# Patient Record
Sex: FEMALE | Race: WHITE
[De-identification: names, ages, dates, MRNs, and addresses within clinical notes are randomized per-mention and may not be internally consistent; named-entity substitution may affect disease eponyms.]

---

## 2017-05-01 NOTE — DIAGNOSTIC IMAGING REPORT
PROCEDURE: CT left lower extremity without contrast.



TECHNIQUE: Multiple contiguous axial images were obtained through

the left lower extremity without the use of intravenous contrast.

Sagittal and coronal reformations were then performed.



INDICATION:  Left ankle swelling and bruising.



COMPARISON: None available.



FINDINGS:

There is no acute or healing fracture about the ankle. Normal

variant os trigonum is present in the posterior aspect of the

talus. There is no edema-like attenuation around the os trigonum

to suggest impingement in this region. No osteochondral lesion of

the talar dome.



The peroneal tendons are normal in position. There is suggestion

of a small amount of fluid in the common peroneal sheath at the

level of the lateral malleolus. The peroneus brevis is intact

upon the base of the fifth metatarsal. The peroneus longus is

normal where visualized. Distal Achilles tendon is normal by CT.

The posterior tibialis, flexor hallucis longus and flexor

digitorum longus tendons also appear normal by CT. Anterior

extensor tendons are also unremarkable.



No ankle joint effusion. The posterior subtalar joint space is

well-maintained. No tarsal coalition. Sclerotic focus in the

anterior and inferior aspect of the calcaneus is compatible with

benign bone island.



IMPRESSION:

1. No acute or healing fracture.

2. Suggestion of tenosynovitis of the common peroneal tendon

sheath at the level of the lateral malleolus. By CT, the peroneal

tendons are otherwise normal in appearance. If patient's symptoms

persist and there are not contraindications, MRI of the ankle can

better evaluate the soft tissues.



Dictated by: 



  Dictated on workstation # [id number]

## 2017-06-02 NOTE — ED CHEST PAIN
General


Chief Complaint:  Chest Pain


Stated Complaint:  FATIGUE/CHEST PAIN


Source:  patient, RN/MD, RN notes reviewed


Exam Limitations:  no limitations





History of Present Illness


Time seen by provider:  21:10


Initial Comments


Patient presents c/ c/o intermittent chest pain, sweating, nausea, and profound 

fatigue this evening PTA.  Apparently has been having spells like this of late 

and is being worked up by her PCP, but has never had an episode as bad as 

earlier this evening.  Spoke c/ Dr. Salguero this PM, and she encouraged her to 

come in and be seen.  Patient states there have some recent concerns regarding 

possible fibromyalgia, or a tick related illness.


Timing/Duration:  getting worse, intermittent, other (current episode this PM 

PTA)


Severity/Quality:  severe (fatigue)


Location:  central


Radiation:  no radiation


Activities at Onset:  activity (walking into house from car)


Prior CP/Workup:  no prior cardiac workup


Modifying Factors:  worse with movement


ASA po PTA:  No


NTG SL PTA:  No


Associated Symptoms:  diaphoresis, fatigue, nausea/vomiting (s/ vomiting)





Allergies and Home Medications


Allergies


Coded Allergies:  


     NKANo Known Allergies (Unverified  Allergy, Mild, 2/14/10)





Home Medications


Cholecalciferol 5,000 Unit Tablet, 5,000 UNIT PO DAILY, (Reported)


Duloxetine HCl 60 Mg Capsule., #30 (Reported)


Estradiol 1 Mg Tablet, #30 (Reported)


Hyoscyamine Sulfate 0.125 Mg Tab.subl, 1-2 TAB SL Q4H, #15


   Prescribed by: NICOLETTE BLISS on 11/30/16 2222


Levothyroxine Sodium 50 Mcg Tablet, 50 MCG PO DAILY, (Reported)


Losartan/Hydrochlorothiazide 1 Each Tablet, 1 EACH PO DAILY, (Reported)


Mirabegron 50 Mg Tab.er.24h, #30 (Reported)


Montelukast Sodium 10 Mg Tablet, #30 (Reported)


Ondansetron 4 Mg Tab.rapdis, 4 MG PO Q4H, #10


   Prescribed by: NICOLETTE BLISS on 11/30/16 2222


Pantoprazole Sodium 40 Mg Tablet.dr, 40 MG PO DAILY, #30 Ref 6


   Prescribed by: CARLOS STEEN on 9/23/15 0759


Saxagliptin Hcl/Metformin Hcl 1 Each Tbmp.24hr, 1 EACH PO DAILY, (Reported)


Sucralfate 1 Gm/10 Ml Oral.susp, 1 GM PO QID, #400


   Prescribed by: NICOLETTE BLISS on 11/30/16 2222


Vitamin B Complex 1 Tab Tablet, 1 TAB PO DAILY, (Reported)





Review of Systems


Constitutional:  see HPI, diaphoresis, malaise


Cardiovascular:  See HPI, Chest Pain


Gastrointestinal:  See HPI, Nausea


Endocrine:  See HPI, Excessive Sweating





All Other Systems Reviewed


Negative Unless Noted:  Yes





Past Medical-Social-Family Hx


Patient Social History


Recent Foreign Travel:  No


Contact w/Someone Who Travel:  No


Recent Hopitalizations:  No





Seasonal Allergies


Seasonal Allergies:  No





Surgeries


HX Surgeries:  Yes


Surgeries:  Bladder Surgery, Hysterectomy, Oophorectomy, Orthopedic, 

Tonsillectomy





Respiratory


Hx Respiratory Disorders:  No





Cardiovascular


Hx Cardiac Disorders:  Yes


Cardiac Disorders:  Hypertension





Neurological


Hx Neurological Disorders:  No





Reproductive System


Hx Reproductive Disorders:  No


Female Reproductive Disorders:  Endometriosis, Polycystic Ovarian Dis


GYN History:  Hysterectomy





Genitourinary


Hx Genitourinary Disorders:  Yes (has bladder stimulator)


Genitourinary Disorders:  Bladder Infection, Neurogenic Bladder





Gastrointestinal


Hx Gastrointestinal Disorders:  Yes


Gastrointestinal Disorders:  Diverticulosis, Ulcer





Musculoskeletal


Hx Musculoskeletal Disorders:  Yes (BACK SX FOR HERNIATED DISC)


Musculoskeletal Disorders:  Chronic Back Pain





Endocrine


Hx Endocrine Disorders:  Yes (OBESITY)


Endocrine Disorders:  Hypothyroidsim





HEENT


HX ENT Disorders:  Yes (SURGERY FOR DETATCHED RETINA)





Cancer


Hx Cancer:  No





Psychosocial


Hx Psychiatric Problems:  No





Integumentary


HX Skin/Integumentary Disorder:  No





Blood Transfusions


Hx Blood Disorders:  No





Family Medical History


Family Medial History:  


Not obtainable due to adoption





Physical Exam


Vital Signs





Vital Sign - Last 12Hours








 6/2/17





 21:08


 


Temp 98.1


 


Pulse 110


 


Resp 18


 


B/P (MAP) 134/101


 


Pulse Ox 96


 


O2 Delivery Room Air


 


O2 Flow Rate 2.0





Capillary Refill :


General Appearance:  No Apparent Distress, WD/WN, Obese


HEENT:  Normal ENT Inspection


Neck:  Normal Inspection


Respiratory:  No Respiratory Distress


Cardiovascular:  Regular Rate, Rhythm, Tachycardia


Gastrointestinal:  Non Tender, Other (obese)


Rectal:  Deferred


Neurologic/Psychiatric:  Alert, Oriented x3, No Motor/Sensory Deficits, Normal 

Mood/Affect


Skin:  Warm/Dry





Focused Exam


Lactic Acid Level





Laboratory Tests








Test


  6/2/17


21:30 6/2/17


23:49


 


Lactic Acid Level


  2.43 MMOL/L


(0.50-2.00)  *H 1.03 MMOL/L


(0.50-2.00)











Progress/Results/Core Measures


Results/Orders


Lab Results





Laboratory Tests








Test


  6/2/17


21:25 6/2/17


21:30 6/2/17


21:49 6/2/17


23:49 Range/Units


 


 


Urine Color YELLOW      


 


Urine Clarity CLEAR      


 


Urine pH 6     5-9  


 


Urine Specific Gravity 1.010 L    1.016-1.022  


 


Urine Protein NEGATIVE     NEGATIVE  


 


Urine Glucose (UA) NEGATIVE     NEGATIVE  


 


Urine Ketones NEGATIVE     NEGATIVE  


 


Urine Nitrite NEGATIVE     NEGATIVE  


 


Urine Bilirubin 2+ H    NEGATIVE  


 


Urine Urobilinogen 4 H    NORMAL  MG/DL


 


Urine Leukocyte Esterase 1+ H    NEGATIVE  


 


Urine RBC (Auto) NEGATIVE     NEGATIVE  


 


Urine RBC NONE      /HPF


 


Urine WBC 2-5      /HPF


 


Urine Squamous Epithelial


Cells 2-5 


  


  


  


   /HPF


 


 


Urine Crystals NONE      /LPF


 


Urine Bacteria NONE      /HPF


 


Urine Casts NONE      /LPF


 


Urine Mucus NEGATIVE      /LPF


 


Urine Culture Indicated NO      


 


White Blood Count


  


  8.6 


  


  


  4.3-11.0


10^3/uL


 


Red Blood Count


  


  4.44 


  


  


  4.35-5.85


10^6/uL


 


Hemoglobin  13.0    11.5-16.0  G/DL


 


Hematocrit  39    35-52  %


 


Mean Corpuscular Volume  89    80-99  FL


 


Mean Corpuscular Hemoglobin  29    25-34  PG


 


Mean Corpuscular Hemoglobin


Concent 


  33 


  


  


  32-36  G/DL


 


 


Red Cell Distribution Width  14.9 H   10.0-14.5  %


 


Platelet Count


  


  276 


  


  


  130-400


10^3/uL


 


Mean Platelet Volume  10.3    7.4-10.4  FL


 


Neutrophils (%) (Auto)  60    42-75  %


 


Lymphocytes (%) (Auto)  26    12-44  %


 


Monocytes (%) (Auto)  10    0-12  %


 


Eosinophils (%) (Auto)  3    0-10  %


 


Basophils (%) (Auto)  0    0-10  %


 


Neutrophils # (Auto)  5.2    1.8-7.8  X 10^3


 


Lymphocytes # (Auto)  2.3    1.0-4.0  X 10^3


 


Monocytes # (Auto)  0.9    0.0-1.0  X 10^3


 


Eosinophils # (Auto)


  


  0.2 


  


  


  0.0-0.3


10^3/uL


 


Basophils # (Auto)


  


  0.0 


  


  


  0.0-0.1


10^3/uL


 


Erythrocyte Sedimentation Rate  30 H   0-20  MM/HR


 


Sodium Level  140    135-145  MMOL/L


 


Potassium Level  3.5 L   3.6-5.0  MMOL/L


 


Chloride Level  104      MMOL/L


 


Carbon Dioxide Level  20 L   21-32  MMOL/L


 


Anion Gap  16 H   5-14  MMOL/L


 


Blood Urea Nitrogen  11    7-18  MG/DL


 


Creatinine


  


  1.49 H


  


  


  0.60-1.30


MG/DL


 


Estimat Glomerular Filtration


Rate 


  38 


  


  


   


 


 


BUN/Creatinine Ratio  7     


 


Glucose Level  142 H     MG/DL


 


Lactic Acid Level


  


  2.43 *H


  


  1.03 


  0.50-2.00


MMOL/L


 


Calcium Level  9.6    8.5-10.1  MG/DL


 


Magnesium Level  2.2    1.8-2.4  MG/DL


 


Total Bilirubin  4.3 H   0.1-1.0  MG/DL


 


Aspartate Amino Transf


(AST/SGOT) 


  197 H


  


  


  5-34  U/L


 


 


Alanine Aminotransferase


(ALT/SGPT) 


  316 H


  


  


  0-55  U/L


 


 


Alkaline Phosphatase  166 H     U/L


 


Troponin I  < 0.30    <0.30  NG/ML


 


C-Reactive Protein High


Sensitivity 


  2.06 H


  


  


  0.00-0.50


MG/DL


 


B-Type Natriuretic Peptide  16.2    <100.0  PG/ML


 


Total Protein  7.5    6.4-8.2  G/DL


 


Albumin  4.1    3.2-4.5  G/DL


 


Lipase  10    8-78  U/L


 


Thyroid Stimulating Hormone


(TSH) 


  0.57 


  


  


  0.35-4.94


UIU/ML








My Orders





Orders - JOCELINE PAGE DO


Saline Lock/Iv-Start (6/2/17 21:15)


Ekg Tracing (6/2/17 21:15)


BNP (6/2/17 21:15)


Cbc With Automated Diff (6/2/17 21:15)


Comprehensive Metabolic Panel (6/2/17 21:15)


Hs C Reactive Protein (6/2/17 21:15)


Lactic Acid Analyzer (6/2/17 21:15)


Lipase (6/2/17 21:15)


Magnesium (6/2/17 21:15)


Thyroid Stimulating Hormone (6/2/17 21:15)


Tick Panel With Lyme Eia (6/2/17 21:15)


Troponin I (6/2/17 21:15)


Ua Culture If Indicated (6/2/17 21:15)


Blood Culture (6/2/17 21:15)


Erythrocyte Sedimentation Rate (6/2/17 21:15)


Saline Lock/Iv-Start (6/2/17 22:03)


Ns Iv 1000 Ml (Sodium Chloride 0.9%) (6/2/17 22:03)


Ct Chest/Abdomen/Pelvis Wo (6/2/17 22:29)





Medications Given in ED





Current Medications








 Medications  Dose


 Ordered  Sig/Juan


 Route  Start Time


 Stop Time Status Last Admin


Dose Admin


 


 Sodium Chloride  1,000 ml @ 


 0 mls/hr  Q0M ONCE


 IV  6/2/17 22:03


 6/2/17 22:04 DC 6/2/17 22:10


0 MLS/HR








Vital Signs/I&O





Vital Sign - Last 12Hours








 6/2/17 6/2/17 6/2/17





 21:08 21:08 22:00


 


Temp 98.1  98.1


 


Pulse 110  98


 


Resp 18  18


 


B/P (MAP) 134/101  135/99


 


Pulse Ox 96  88


 


O2 Delivery Room Air Nasal Cannula 


 


O2 Flow Rate  2.0 2.0














Intake and Output 


 


 6/3/17





 00:00


 


Intake Total 1000 ml


 


Balance 1000 ml











Diagnostic Imaging





   Diagonstic Imaging:  CT


   Plain Films/CT/US/NM/MRI:  abdomen, pelvis (nothing acute x/ hepatomegaly)





Departure


Communication


Time/Spoke to Admitting Phy:  23:40





Impression


Impression:  


 Primary Impression:  


 Fatigue


 Additional Impressions:  


 Elevated LFT's of UDE


 Non-cardiac chest pain


 Elevated lactic acid level


Disposition:  09 ADMITTED AS INPATIENT


Condition:  Stable


Decision to Admit Reason:  Admit from ER (General)


Decision to Admit/Date:  Jun 2, 2017


Time/Decision to Admit Time:  23:40





Departure-Patient Inst.


Referrals:  


ECHO DUARTE DO (PCP/Family)


Primary Care Physician











JOCELINE PAGE DO Jun 2, 2017 21:04

## 2017-06-03 NOTE — DIAGNOSTIC IMAGING REPORT
PROCEDURE: US Gallbladder.



TECHNIQUE: Multiple real-time grayscale images were obtained over

the right upper quadrant in various projections.



INDICATION:  Right upper quadrant pain. 



COMPARISON: CT chest, abdomen and pelvis without contrast

6/2/2017.



FINDINGS: Examination limited by technical limitations related to

tissue depth. Hyperechogenicity of the liver consistent with

diffuse fatty infiltration. No focal mass or fluid collection in

the liver identified. No cholelithiasis. Gallbladder wall

thickness measures approximately 2 mm. No pericholecystic fluid

collection. The common bile duct and pancreas are obscured. Right

kidney is partially visualized and measures approximately 11.5

cm. No evidence of hydronephrosis. Negative sonographic Cleaning

sign. No free fluid identified in the visualized abdomen.



IMPRESSION: 

1. Limited examination.

2. Hepatic steatosis.

3. The gallbladder is unremarkable. The common bile duct is

obscured.



Dictated by: 



  Dictated on workstation # [id number]

## 2017-06-03 NOTE — DIAGNOSTIC IMAGING REPORT
PROCEDURE: CT chest, abdomen, and pelvis without contrast.



TECHNIQUE: Multiple contiguous axial images were obtained through

the chest, abdomen, and pelvis without the use of intravenous

contrast.



INDICATION:  Chest pain, fatigue, and weakness.



COMPARISON: Comparison is made with the prior study from November 30, 2016.



FINDINGS: The lungs demonstrate no evidence of a focal

consolidation. There is some minor dependent atelectasis at the

lung bases. There is no effusion or pneumothorax. There is no

evidence of mediastinal adenopathy. The thoracic aorta is normal

in caliber and heart size appears normal. There is no pericardial

effusion.



Hepatomegaly is present with associated steatosis. There is some

focal fatty sparing adjacent to the gallbladder. There is no

radiodense gallstone or evidence of biliary dilatation. The

spleen is unremarkable. There are some small splenules. Pancreas

demonstrates some mild atrophy but no focal abnormality. There is

stable right adrenal nodule. The kidneys appear nonobstructed

without evidence of urolithiasis.



There is extensive diverticulosis present with some haziness

within the fat about the sigmoid colon which may reflect a mild

diverticulitis or colitis. There is no free air, free fluid, or

abscess. The appendix is normal.



Patient is status post hysterectomy. Urinary bladder is

unremarkable. No pathologically enlarged abdominal or pelvic

lymph nodes are demonstrated. The aorta is normal in caliber.

There are degenerative features within the lumbar spine, but

there is no acute or suspicious osseous abnormality demonstrated.

A sacral InterStim device is noted. There is a small

fat-containing umbilical hernia.



IMPRESSION:

1. No acute process is evident within the chest.

2. Extensive colonic diverticulosis with mild haziness around the

sigmoid colon suggesting a mild diverticulitis or colitis. There

is no free air, free fluid, abscess, or obstruction.

3. Hepatic steatosis and hepatomegaly.

4. Status post previous hysterectomy.

5. Stable right adrenal nodule.



Dictated by: 



  Dictated on workstation # [id number]

## 2017-06-03 NOTE — HISTORY & PHYSICIAL
History of Present Illness


History of Present Illness


Reason for visit/HPI


PT IS A 46 Y/O FEMALE WHO IS A CLINIC PATIENT OF DR. BASILIO FOR WHOM I AM ON 

CALL TODAY.  SHE PRESENTED TO THE EMERGENCY DEPARTMENT WITH ABDOMINAL DISCOMFORT

/EPIGASTRIC DISCOMFORT, AND SEVERE WEAKNESS AND FATIGUE.  


SHE REPORTS THAT SHE HAS BEEN FEELING UNWELL FOR ABOUT 3-4 WEEKS.  SHE STATES 

THAT SHE HAS BEEN SEEN AT URGENT CARE LAST WEEK AND DIAGNOSED WITH A "POSSIBLE" 

URINARY TRACT INFECTION, STARTED ON BACTRIM DS AND PYRIDIUM, STARTED TO HAVE 

ITCHING, AND CALLED DR. BASILIO WHO TOLD THE PATIENT TO STOP THE ANTIBIOTIC DUE 

TO HER SYMPTOMS OF ITCHING.  SHE STATES THAT SHE NEVER REALLY HAD URINARY TRACT 

SYMPTOMS.


SHE REPORTS THAT SHE HAD LABS DONE A WEEK OR SO AGO WHICH SHOWED AN ELEVATED 

WHITE COUNT.  SHE DOES NOT RECALL A REPORT OF ELEVATED LIVER ENZYMES.


SHE REPORTS THAT HER MEDICATION WAS RECENTLY CHANGED - WITH STOPPING OF HER 

METFORMIN AND STARTING OF JANUMET INSTEAD.  SHE REPORTS THAT SHE IS SUPPOSED TO 

BE ON CYMBALTA, BUT HAS NOT BEEN ABLE TO AFFORD THE MEDICATION AND HAS NOT BEEN 

TAKING IT FOR ABOUT 2 MONTHS.





SHE COMPLAINS OF SOME ITCHING OF HER SKIN.


SHE DENIES ANY ACUTE RASHES.


Date of Admission


Jun 2, 2017 at 23:50


I consulted on this patient on


6/3/17


 08:53


Attending Physician


Jocelyn Basilio DO


Admitting Physician


 Jerson Sandy MD


Consult








Allergies and Home Medications


Allergies


Coded Allergies:  


     NKANo Known Allergies (Unverified  Allergy, Mild, 2/14/10)





Home Medications


Cholecalciferol 5,000 Unit Tablet, 5,000 UNIT PO DAILY, (Reported)


Diclofenac Sod 100 Mg Tab, 100 MG PO BID, (Reported)


Duloxetine HCl 60 Mg Capsule., #30 (Reported)


Estradiol 1 Mg Tablet, #30 (Reported)


Fesoterodine Fumarate 4 Mg Tab.sr.24h, 4 MG PO DAILY, (Reported)


Hyoscyamine Sulfate 0.125 Mg Tab.subl, 1-2 TAB SL Q4H, #15


   Prescribed by: NICOLETTE BLISS on 11/30/16 2222


Levothyroxine Sodium 50 Mcg Tablet, 50 MCG PO DAILY, (Reported)


Losartan/Hydrochlorothiazide 1 Each Tablet, 1 EACH PO DAILY, (Reported)


Mirabegron 50 Mg Tab.er.24h, #30 (Reported)


Montelukast Sodium 10 Mg Tablet, #30 (Reported)


Ondansetron 4 Mg Tab.rapdis, 4 MG PO Q4H, #10


   Prescribed by: NICOLETTE BLISS on 11/30/16 2222


Pantoprazole Sodium 40 Mg Tablet.dr, 40 MG PO DAILY, #30 Ref 6


   Prescribed by: CARLOS STEEN on 9/23/15 0759


Saxagliptin Hcl/Metformin Hcl 1 Each Tbmp.24hr, 1 EACH PO DAILY, (Reported)


Sucralfate 1 Gm/10 Ml Oral.susp, 1 GM PO QID, #400


   Prescribed by: NICOLETTE BLISS on 11/30/16 2222


Tramadol HCl 50 Mg Tablet, 50 MG PO q4-6 PRN for PAIN-MILD TO MODERATE, (

Reported)


Vitamin B Complex 1 Tab Tablet, 1 TAB PO DAILY, (Reported)





Past Medical-Social-Family Hx


Patient Social History


Marrital Status:  


Living Status:  LIVES AT HOME WITH HER SPOUSE


Employed/Student:  employed (WORKS AT [employer])


Alcohol Use:  Denies Use


Recreational Drug Use:  No


Smoking Status:  Never a Smoker


2nd Hand Smoke Exposure:  Yes (AT WORK)


Physical Abuse Screen:  No


Sexual Abuse:  No


Recent Foreign Travel:  No


Contact w/other who traveled:  No


Recent Hopitalizations:  No


Recent Infectious Disease Expo:  No





Immunizations Up To Date


Tetanus Booster (TDap):  Less than 5yrs





Seasonal Allergies


Seasonal Allergies:  No





Surgeries


HX Surgeries:  Yes


Surgeries:  Bladder Surgery, Hysterectomy, Oophorectomy, Orthopedic, 

Tonsillectomy





Respiratory


Hx Respiratory Disorders:  No





Cardiovascular


Hx Cardiovascular Disorders:  Yes


Cardiac Disorders:  Hypertension





Neurological


Hx Neurological Disorders:  No





Reproductive System


Pregnant:  No


Hx Reproductive Disorders:  No


Female Reproductive Disorders:  Endometriosis, Polycystic Ovarian Dis


GYN Hx:  Hysterectomy





Genitourinary


Hx Genitourinary Disorders:  Yes (has bladder stimulator)


Genitourinary Disorders:  Bladder Infection, Neurogenic Bladder





Gastrointestinal


Hx Gastrointestinal Disorders:  Yes


Gastrointestinal Disorders:  Diverticulosis, Ulcer





Musculoskeletal


Hx Musculoskeletal Disorders:  Yes (BACK SX FOR HERNIATED DISC)


Musculoskeletal Disorders:  Chronic Back Pain





Endocrine


Hx Endocrine Disorders:  Yes (OBESITY)


Endocrine Disorders:  Hypothyroidsim, Diabetes, Non-Insulin dep





HEENT


HX ENT Disorders:  Yes (SURGERY FOR DETATCHED RETINA)


Loss of Vision:  Denies


Hearing Impairment:  Denies





Cancer


Hx Cancer:  No





Psychosocial


Hx Psychiatric Problems:  Yes


Behavioral Health Disorders:  Depression





Integumentary


HX Skin/Integumentary Disorder:  No





Blood Transfusions


Hx Blood Disorders:  No


Adverse Reaction to a Blood Tr:  No





Reviewed Nursing Assessment


Reviewed/Agree w Nursing PMH:  Yes





Family Medical History


Significant Family History:  Other Conditions/Hx (PT ADOPTED, NO FAMILY HISTORY 

AVAILABLE)


Family Hx:  


Not obtainable due to adoption





Constitutional:  No chills, malaise, weakness


EENTM:  No blurred vision, No double vision, No hoarseness, No mouth pain, No 

throat pain


Respiratory:  No cough, No dyspnea on exertion, No short of breath, No wheezing


Cardiovascular:  No chest pain, No palpitations


Gastrointestinal:  abdominal pain (RUQ), No constipation, No diarrhea, No loss 

of appetite, No melena, No nausea, No vomiting


Genitourinary:  no symptoms reported, No dysuria, No frequency


Pregnant:  No


Musculoskeletal:  No back pain, No muscle stiffness, muscle weakness


Skin:  change in color (SLIGHTLY YELLOW), No dryness, No lesions, pruritus, No 

rash


Psychiatric/Neurological:  Denies Anxiety, Denies Headache, Denies Numbness, 

Weakness


All Other Systems Reviewed


Negative Unless Noted:  Yes





Physical Exam


Vital Signs





Vital Sign - Last 12Hours








 6/2/17





 21:08


 


Temp 98.1


 


Pulse 110


 


Resp 18


 


B/P (MAP) 134/101


 


Pulse Ox 96


 


O2 Delivery Room Air


 


O2 Flow Rate 2.0





Capillary Refill : Less Than 3 Seconds


General Appearance:  No Apparent Distress, WD/WN, Obese


Eyes:  Bilateral Eye EOMI, Bilateral Eye Normal Inspection, Bilateral Eye PERRL


HEENT:  PERRL/EOMI, Pharynx Normal


Neck:  Full Range of Motion, Supple


Respiratory:  Chest Non Tender, Lungs Clear, Normal Breath Sounds, No Accessory 

Muscle Use, No Respiratory Distress


Cardiovascular:  Regular Rate, Rhythm, No Edema, No Murmur


Gastrointestinal:  Normal Bowel Sounds, Hepatomegaly, Tenderness (IN RIGHT 

UPPER QUADRANT, EPIGASTRIUM)


Rectal:  Deferred


Back:  Normal Inspection, No CVA Tenderness, No Vertebral Tenderness


Extremity:  Normal Capillary Refill, Normal Range of Motion, Non Tender, No 

Calf Tenderness, No Pedal Edema


Neurologic/Psychiatric:  Alert, Oriented x3, No Motor/Sensory Deficits, Normal 

Mood/Affect, CNs II-XII Norm as Tested


Skin:  Warm/Dry, Jaundice (VERY MILD JAUNDICE)


Lymphatic:  No Adenopathy





Assessment/Plan


Assessment and Plan


RIGHT UPPER QUADRANT PAIN


ELEVATED LIVER ENZYMES


HYPERBILIRUBINEMIA


HEPATOMEGALY


FATIGUE


MUSCLE WEAKNESS


ELEVATED LACTIC ACID


HYPERTENSION


DEPRESSION


URINARY URGE INCONTINENCE


DIABETES MELLITUS








RIGHT UPPER QUADRANT PAIN, ELEVATED LIVER ENZYMES, HYPERBILIRUBINEMIA, 

HEPATOMEGALY, ELEVATED LACTIC ACID - PT ADMITTED TO THE HOSPITAL WITH ELEVATED 

LIVER ENZYMES, HEPATOMEGALY AND HYPERBILIRUBINEMIA - WITH HER RIGHT UPPER 

QUADRANT PAIN, I HAVE ORDERED A GALLBLADDER ULTRASOUND.  HER CT SCAN FINDINGS 

HAVE NOT YET BEEN FINALIZED ON THE COMPUTER SYSTEM.





FATIGUE AND MUSCLE WEAKNESS - SHOULD IMPROVE WITH PATIENT'S HYDRATION, AND 

IMPROVEMENT OF LIVER ENZYMES.





HYPERTENSION  - HOLD CURRENT MEDICATION, WILL START ON METFORMIN IF 

ANTIHYPERTENSIVE MEDICATION IS NEEDED AS HER CURRENT MEDICATION HAS HEPATIC 

METABOLISM.





DEPRESSION - PT HAS NOT BEEN TAKING HER CYMBALTA, THE METABOLISM IS THROUGH 

LIVER ENZYME PATHWAYS, THEREFORE, WOULD NOT RESTART AT THIS TIME.





URINARY URGE INCONTINENCE  - HOLD MYRBETRIQ AS IT MAY BE HAVING SOME IMPACT ON 

HEPATIC FAILURE - MONITOR SYMPTOMS, MAY CONSIDER OTHER MEDICATIONS DEPENDING ON 

METABOLISM AND HER LIVER SYMPTOMS.





DIABETES MELLITUS - HOLD DIABETIC MEDICATIONS AT THIS TIME, WITH PT ON LIQUID 

DIET, MONITOR FSBS, HGBA1C PENDING, IF NEEDED WILL CONSIDER SLIDING SCALE 

INSULIN.


Problems:  





Admission Diagnosis


RIGHT UPPER QUADRANT PAIN


ELEVATED LIVER ENZYMES


HYPERBILIRUBINEMIA


HEPATOMEGALY


FATIGUE


MUSCLE WEAKNESS


ELEVATED LACTIC ACID


HYPERTENSION


DEPRESSION


URINARY URGE INCONTINENCE


DIABETES MELLITUS





Clinical Quality Measures


AMI/AHF:


ASA po Prior to arrival:  No





DVT/VTE Risk/Contraindication:


Risk Factor Score Per Nursing:  3


RFS Level Per Nursing on Admit:  3=High











JERSON SANDY MD Jair 3, 2017 08:53

## 2017-06-04 NOTE — PROGRESS NOTE (SOAP)
Subjective


Date Seen by Provider:  Jun 4, 2017


Time Seen by Provider:  08:29


Subjective/Events-last exam


PT REPORTS THAT SHE IS FEELING BETTER.  SHE STATES THAT SHE STILL HAS SOME 

ABDOMINAL PAIN - PAIN IN EPIGASTRIUM.


Review of Systems


General:  Fatigue


Pulmonary:  No Dyspnea, No Cough


Cardiovascular:  No: Chest Pain


Gastrointestinal:  Abdominal Pain (EPIGASTRIUM), No: Nausea


Neurological:  Weakness, No: Confusion





Objective


Exam





Vital Signs








  Date Time  Temp Pulse Resp B/P (MAP) Pulse Ox O2 Delivery O2 Flow Rate FiO2


 


6/4/17 07:00  81      


 


6/4/17 04:00 97.3 70 18 119/57 95   


 


6/4/17 01:00  95      


 


6/4/17 00:00 98.2 92 18 122/70 94   


 


6/3/17 20:00 97.3 81 18 135/86 97   


 


6/3/17 19:00  102      


 


6/3/17 16:00 98.0 55 18 135/83 95   


 


6/3/17 13:00  83      


 


6/3/17 12:00 96.6 71 20 124/59 94   














I & O 


 


 6/4/17





 07:00


 


Intake Total 1860 ml


 


Output Total 2325 ml


 


Balance -465 ml





Capillary Refill : Less Than 3 Seconds


General Appearance:  No Apparent Distress, WD/WN


HEENT:  PERRL/EOMI, Pharynx Normal


Neck:  Full Range of Motion, Supple


Respiratory:  Chest Non Tender, Lungs Clear, Normal Breath Sounds, No Accessory 

Muscle Use


Cardiovascular:  Regular Rate, Rhythm, No Edema


Gastrointestinal:  normal bowel sounds, soft, tenderness (EPIGASTRIUM)


Neurologic/Psychiatric:  Alert, Oriented x3, No Motor/Sensory Deficits, Normal 

Mood/Affect


Skin:  Warm/Dry


Lymphatic:  No Adenopathy





Results


Lab


Laboratory Tests


6/3/17 10:37: Glucometer 169H


6/3/17 15:27: Glucometer 115H


6/3/17 21:06: Glucometer 159H


6/4/17 04:05: 


White Blood Count 6.3, Red Blood Count 4.06L, Hemoglobin 11.8, Hematocrit 37, 

Mean Corpuscular Volume 90, Mean Corpuscular Hemoglobin 29, Mean Corpuscular 

Hemoglobin Concent 32, Red Cell Distribution Width 15.2H, Platelet Count 213, 

Mean Platelet Volume 10.8H, Sodium Level 140, Potassium Level 4.2, Chloride 

Level 108H, Carbon Dioxide Level 24, Anion Gap 8, Blood Urea Nitrogen 4L, 

Creatinine 0.67, Estimat Glomerular Filtration Rate > 60, BUN/Creatinine Ratio 6

, Glucose Level 113H, Calcium Level 8.5, Total Bilirubin 2.9H, Aspartate Amino 

Transf (AST/SGOT) 105H, Alanine Aminotransferase (ALT/SGPT) 222H, Alkaline 

Phosphatase 141H, Total Protein 6.0L, Albumin 3.1L


6/4/17 05:08: Glucometer 120H





Microbiology


6/2/17 Blood Culture - Preliminary, Resulted


         No growth





Assessment/Plan


Assessment/Plan


Assess & Plan/Chief Complaint


RIGHT UPPER QUADRANT PAIN


ELEVATED LIVER ENZYMES


HYPERBILIRUBINEMIA


HEPATOMEGALY


FATIGUE


MUSCLE WEAKNESS


ELEVATED LACTIC ACID


HYPERTENSION


DEPRESSION


URINARY URGE INCONTINENCE


DIABETES MELLITUS








RIGHT UPPER QUADRANT PAIN, ELEVATED LIVER ENZYMES, HYPERBILIRUBINEMIA, 

HEPATOMEGALY, ELEVATED LACTIC ACID - PT ADMITTED TO THE HOSPITAL WITH ELEVATED 

LIVER ENZYMES, HEPATOMEGALY AND HYPERBILIRUBINEMIA - WITH HER RIGHT UPPER 

QUADRANT PAIN, I HAVE ORDERED A GALLBLADDER ULTRASOUND - WHICH WAS NEGATIVE.  

HER CT SCAN FINDINGS SHOW A POSSIBLE DIVERTICULITIS - STARTED ON FLAGYL.





FATIGUE AND MUSCLE WEAKNESS - SHOULD CONTINUE TO IMPROVE WITH PATIENT'S 

HYDRATION, AND IMPROVEMENT OF LIVER ENZYMES.





HYPERTENSION  - HOLD CURRENT MEDICATION, WILL START ON METOPROLOL





DEPRESSION - PT HAS NOT BEEN TAKING HER CYMBALTA, THE METABOLISM IS THROUGH 

LIVER ENZYME PATHWAYS, THEREFORE, WOULD NOT RESTART AT THIS TIME.





URINARY URGE INCONTINENCE  - HOLD MYRBETRIQ AS IT MAY BE HAVING SOME IMPACT ON 

HEPATIC FAILURE - MONITOR SYMPTOMS, MAY CONSIDER OTHER MEDICATIONS DEPENDING ON 

METABOLISM AND HER LIVER SYMPTOMS.





DIABETES MELLITUS - HOLD DIABETIC MEDICATIONS AT THIS TIME, WITH PT ON LIQUID 

DIET, MONITOR FSBS, HGBA1C 5.8





Clinical Quality Measures


AMI/AHF:


ASA po Prior to arrival:  No





DVT/VTE Risk/Contraindication:


Risk Factor Score Per Nursing:  3


RFS Level Per Nursing on Admit:  3=High











JERSON SANDY MD Jun 4, 2017 08:29

## 2017-06-05 NOTE — PROGRESS NOTE (SOAP)
Subjective


Date Seen by Provider:  Jun 5, 2017 (n)


Time Seen by Provider:  08:38


Subjective/Events-last exam


F/U colitis, fatigue, elevated liver enzymes





c/o fatigue and RUQ pain


Review of Systems


General:  Fatigue


HEENT:  No Head Aches, No Visual Changes, No Eye Pain, No Ear Pain, No Dysphasia

, No Sinus Congestion, No Post Nasal Drip, No Sore Throat, No Other


Pulmonary:  No Dyspnea, No Cough, No Pleuritic Chest Pain, No Other


Cardiovascular:  No: Chest Pain, Edema, Lt Headedness, Orthopnea, Other, 

Palpitations, Paroxysmal Noc. Dyspnea


Gastrointestinal:  Abdominal Pain


Genitourinary:  Frequency


Musculoskeletal:  No: arm pain, back pain, foot pain, hand pain, leg pain, neck 

pain, other, shoulder pain


Neurological:  Weakness





Objective


Exam





Vital Signs








  Date Time  Temp Pulse Resp B/P (MAP) Pulse Ox O2 Delivery O2 Flow Rate FiO2


 


6/5/17 04:00 96.9 70 20 146/85 92   


 


6/5/17 01:00  94      


 


6/5/17 00:00 97.3 100 18 144/82 93   


 


6/4/17 20:00     93   


 


6/4/17 19:51 97.1 77 20 176/95 97   


 


6/4/17 19:00  76      


 


6/4/17 15:55 97.5 74 20 162/92 97   


 


6/4/17 13:00  77      


 


6/4/17 12:00 97.7 72 20 139/74 96   














I & O 


 


 6/5/17





 07:00


 


Intake Total 1780 ml


 


Output Total 4150 ml


 


Balance -2370 ml





Capillary Refill : Less Than 3 Seconds


General Appearance:  No Apparent Distress


HEENT:  PERRL/EOMI


Neck:  No Full Range of Motion, No Normal Inspection, No Non Tender, No Supple, 

No Carotid Bruit, No JVD, No Limited Range of Motion, No Lymphadenopathy (L), 

No Lymphadenopathy (R), No Tender Lateral, No Tender Midline, No Thyromegaly, 

No Other


Respiratory:  Lungs Clear


Cardiovascular:  Regular Rate, Rhythm


Gastrointestinal:  normal bowel sounds, soft, tenderness (RUQ and LUQ)


Extremity:  No Normal Capillary Refill, No Normal Inspection, No Normal Range 

of Motion, No Non Tender, No No Calf Tenderness, No No Pedal Edema, No Calf 

Tenderness, No Inflammation, No Pedal Edema, No Pelvis Stable, No Slow 

Capillary Refill, No Swelling, No Other


Neurologic/Psychiatric:  Alert


Skin:  Normal Color, Warm/Dry





Results


Lab


Laboratory Tests


6/4/17 11:38: Glucometer 190H


6/4/17 15:36: Glucometer 112H


6/4/17 21:06: Glucometer 150H


6/5/17 04:54: 


White Blood Count 7.4, Red Blood Count 3.97L, Hemoglobin 11.4L, Hematocrit 36, 

Mean Corpuscular Volume 91, Mean Corpuscular Hemoglobin 29, Mean Corpuscular 

Hemoglobin Concent 32, Red Cell Distribution Width 14.9H, Platelet Count 186, 

Mean Platelet Volume 10.8H, Sodium Level 138, Potassium Level 4.1, Chloride 

Level 107, Carbon Dioxide Level 21, Anion Gap 10, Blood Urea Nitrogen 9, 

Creatinine 0.65, Estimat Glomerular Filtration Rate > 60, BUN/Creatinine Ratio 

14, Glucose Level 131H, Calcium Level 8.6, Total Bilirubin 1.6H, Aspartate 

Amino Transf (AST/SGOT) 58H, Alanine Aminotransferase (ALT/SGPT) 170H, Alkaline 

Phosphatase 127, Total Protein 6.1L, Albumin 3.2





Microbiology


6/2/17 Blood Culture - Preliminary, Resulted


         No growth





Assessment/Plan


Assessment/Plan


Assess & Plan/Chief Complaint


1) Colitis- d/c ABX as appears to be viral etiology, D/C IVF 





2) Fatigue- improving





3) Elevated liver enzymes- improving Hepatitis and Tick panel pending.





4) Possible discharge-pending finals on both cultures





Clinical Quality Measures


AMI/AHF:


ASA po Prior to arrival:  No





DVT/VTE Risk/Contraindication:


Risk Factor Score Per Nursing:  3


RFS Level Per Nursing on Admit:  3=High











ECHO DUARTE DO Jun 5, 2017 08:42

## 2017-06-06 NOTE — DISCHARGE INST-SIMPLE/STANDARD
Discharge Inst-Standard


Patient Instructions/Follow Up


Plan of Care/Instructions/FU:  


Fwup 1 week


Activity as Tolerated:  Yes


Discharge Diet:  ADA Diet, Cardiac Diet


Other Inst to Patient


May return to work on 6/8/17











ECHO DUARTE DO Jun 6, 2017 8:56 am

## 2017-06-06 NOTE — DISCHARGE SUMMARY
Diagnosis/Chief Complaint


Date of Admission


Jair 3, 2017 at 8:52 am


Date of Discharge





Discharge Date:  Jun 6, 2017


Admission Diagnosis


Admission Diagnosis


RIGHT UPPER QUADRANT PAIN


ELEVATED LIVER ENZYMES


HYPERBILIRUBINEMIA


HEPATOMEGALY


FATIGUE


MUSCLE WEAKNESS


ELEVATED LACTIC ACID


HYPERTENSION


DEPRESSION


URINARY URGE INCONTINENCE


DIABETES MELLITUS





Discharge Diagnosis


1.  Acute Gastroenteritis/Colitis secondary to viral syndrome with elevated LFTs

--improving


2.  Fatigue--due to acute viral syndrome


3.  Hypertension--stable


4.  Diabetes mellitus II--BS stable


5.  Urinary Incontinence--worsened with IVFs and holding of meds


6.  Morbid Obesity--looking into bariatric surgery





Discharge Summary


Hospital Course


Hospital Course


This is a 47 year old female who presented to the emergency room with fatigue, 

nausea and worsening abdominal pain.  She was found to have colitis on CT scan 

of the abdomen and pelvis and elevated liver enzymes.  She was admitted and 

started on Levaquin and Flagyl as well as IVF rehydration for possible early 

diverticulitis.  However, with a normal WBC count and no fever, I stopped the 

antibiotics the day prior to discharge as her clinical picture looked like a 

viral syndrome.  Her liver enzymes were coming down every day with a high on 

admission of 197 on AST, 316 on ALT, and 4.3 on bilirubin.  They were down to 43

, 137, and 1.3 respectively on the day of discharge.  She was tolerating a 

regular diet with no nausea and no abdominal pain.  She was afebrile and her 

WBC count was normal.  She still complained of fatigue.  Her hepatitis panel 

was negative and her tick titers are pending but she has not been on any 

doxycycline and is clinically improving so I suspect they will be negative.  

She will be sent home with supportive care and fwup with me in my office in 1 

week with recheck of liver enzymes and CBC.


Labs


Laboratory Tests


6/3/17 15:27: Glucometer 115H


6/3/17 21:06: Glucometer 159H


6/4/17 04:05: 


Red Blood Count 4.06L, Red Cell Distribution Width 15.2H, Mean Platelet Volume 

10.8H, Chloride Level 108H, Blood Urea Nitrogen 4L, Glucose Level 113H, Total 

Bilirubin 2.9H, Aspartate Amino Transf (AST/SGOT) 105H, Alanine 

Aminotransferase (ALT/SGPT) 222H, Alkaline Phosphatase 141H, Total Protein 6.0L

, Albumin 3.1L


6/4/17 05:08: Glucometer 120H


6/4/17 11:38: Glucometer 190H


6/4/17 15:36: Glucometer 112H


6/4/17 21:06: Glucometer 150H


6/5/17 04:54: 


Red Blood Count 3.97L, Hemoglobin 11.4L, Red Cell Distribution Width 14.9H, 

Mean Platelet Volume 10.8H, Glucose Level 131H, Total Bilirubin 1.6H, Aspartate 

Amino Transf (AST/SGOT) 58H, Alanine Aminotransferase (ALT/SGPT) 170H, Total 

Protein 6.1L


6/5/17 10:58: Glucometer 170H


6/5/17 15:57: Glucometer 146H


6/5/17 21:02: Glucometer 172H


6/6/17 04:40: 


Red Blood Count 4.11L, Red Cell Distribution Width 14.9H, Mean Platelet Volume 

11.2H, Eosinophils # (Auto) 0.5H, Glucose Level 130H, Total Bilirubin 1.3H, 

Aspartate Amino Transf (AST/SGOT) 43H, Alanine Aminotransferase (ALT/SGPT) 137H


6/6/17 05:46: Glucometer 119H


6/6/17 11:06: Glucometer 180H





Procedures


None.





Discharge Physical Examination


Allergies:  


Coded Allergies:  


     sulfamethoxazole (Verified  Adverse Reaction, Intermediate, RASH/ITCHING, 6 /5/17)


     trimethoprim (Verified  Adverse Reaction, Intermediate, RASH/ITCHING, 6/5/ 17)


Vitals & I&Os





Vital Signs








  Date Time  Temp Pulse Resp B/P (MAP) Pulse Ox O2 Delivery O2 Flow Rate FiO2


 


6/6/17 08:00 97.9 68 16 149/82 96   


 


6/2/17 22:00       2.0 


 


6/2/17 21:08      Nasal Cannula  








General Appearance:  Alert, Oriented X3, Cooperative, No Acute Distress


Respiratory:  Clear to Auscultation


Cardiovascular:  Regular Rate


Abdominal:  Normal Bowel Sounds, Soft, Other (mild RUQ tenderness)


Extremities:  No Clubbing, No Cyanosis


Skin:  No Rashes


Neuro:  Normal Gait, Normal Speech


Psych/Mental Status:  Mental Status NL





Discharge


Home Medications


Reviewed and agree with Discharge Medication list on patient's Discharge 

Instruction sheet


Instructions to Patient/Family


Please see electonic discharge instructions given to patient.





Clinical Quality Measures


AMI/AHF:


ASA po Prior to arrival:  No





DVT/VTE Risk/Contraindication:


Risk Factor Score Per Nursing:  3


RFS Level Per Nursing on Admit:  3=High











ECHO DUARTE DO Jun 6, 2017 1:10 pm

## 2017-07-24 NOTE — CONSCIOUS SEDATION/ASA
Conscious Sedation Pre-Proced


Time Reviewed:  09:57


ASA Class:  2











Airway Mallampati Classification: ([place] appropriate class) I.  II.  III,  IV


 


Lungs 


 


Heart 


 


 ASA score


 


 ASA 1: a normal healthy patient


 


 ASA 2:  a patient with a mild systemic disease (mid diabetes, controlled 

hypertension, obesity 


 


 ASA 3:  a patient with a severe systemic disease that limits activity  (angina

, COPD, prior Myocardial infarction)


 


 ASA 4:  a patient with an incapacitating disease that is a constant threat to 

life (CHF, renal failure)


 


 ASA 5:  a moribund patient not expected to survive 24 hrs.  (ruptured aneurysm)


 


 ASA 6:  a declared brain dead patient whose organs are being harvested.


 


 For emergent operations, add the letter E after the classification








Grade 1


Sedation Plan:  Discussed options with patient/fam


Note


The patient is an appropriate candidate to undergo the planned procedure, 

sedation, and anesthesia.





The patient immediately re-assessed prior to indication.











CARLOS STEEN MD Jul 24, 2017 9:57 am

## 2017-07-24 NOTE — HISTORY & PHYSICIAL
History of Present Illness


History of Present Illness


Reason for visit/HPI


to undergo an upper endoscopy with possible balloon dilatation of a recurrent 

esophageal stricture.  Previous dilatation in 2015 with improvement of 4 

symptoms.


Date of Admission





Date Seen by Provider:  Jul 24, 2017


Time Seen by Provider:  09:55


I consulted on this patient on


7/24/17


 09:55


Attending Physician


Carlos Myrick MD


Admitting Physician


Jocelyn Basilio DO


Consult








Allergies and Home Medications


Allergies


Coded Allergies:  


     sulfamethoxazole (Verified  Adverse Reaction, Intermediate, RASH/ITCHING, 6 /5/17)


     trimethoprim (Verified  Adverse Reaction, Intermediate, RASH/ITCHING, 6/5/ 17)





Home Medications


Cholecalciferol 5,000 Unit Tablet, 5,000 UNIT PO DAILY, (Reported)


Cyanocobalamin 1,000 Mcg/Ml Inj, 1,000 MCG INJ MONTHLY, (Reported)


Estradiol 1 Mg Tablet, 1 MG PO DAILY, (Reported)


Fesoterodine Fumarate 4 Mg Tab.sr.24h, 4 MG PO DAILY, (Reported)


Levothyroxine Sodium 75 Mcg Tablet, 75 MCG PO DAILY, (Reported)


Losartan/Hydrochlorothiazide 1 Each Tablet, 1 TAB PO DAILY, (Reported)


Mirabegron 50 Mg Tab.er.24h, 50 MG PO DAILY, (Reported)


Montelukast Sodium 10 Mg Tablet, 10 MG PO HS, (Reported)


Naltrexone HCl/Bupropion HCl 1 Each Tablet.er, 2 TAB PO BID, (Reported)


Pantoprazole Sodium 40 Mg Tablet.dr, 40 MG PO BID, (Reported)





Past Medical-Social-Family Hx


Patient Social History


2nd Hand Smoke Exposure:  Yes (AT WORK)


Recent Foreign Travel:  No


Contact w/other who traveled:  No


Recent Hopitalizations:  No





Immunizations Up To Date


Tetanus Booster (TDap):  Less than 5yrs





Seasonal Allergies


Seasonal Allergies:  Yes





Surgeries


HX Surgeries:  Yes (back sx)


Surgeries:  Bladder Surgery, Hysterectomy, Oophorectomy, Orthopedic, 

Tonsillectomy





Respiratory


Hx Respiratory Disorders:  No (recent sleep study done-needs a cpap machine-

waiting)





Cardiovascular


Hx Cardiovascular Disorders:  Yes


Cardiac Disorders:  Hypertension





Neurological


Hx Neurological Disorders:  No





Reproductive System


Hx Reproductive Disorders:  No


Female Reproductive Disorders:  Endometriosis, Polycystic Ovarian Dis


GYN Hx:  Hysterectomy





Genitourinary


Hx Genitourinary Disorders:  Yes (has bladder stimulator)


Genitourinary Disorders:  Bladder Infection, Neurogenic Bladder





Gastrointestinal


Hx Gastrointestinal Disorders:  Yes (dyspagea)


Gastrointestinal Disorders:  Gastroesophageal Reflux, Diverticulosis, Ulcer





Musculoskeletal


Hx Musculoskeletal Disorders:  Yes (BACK SX FOR HERNIATED DISC)


Musculoskeletal Disorders:  Chronic Back Pain





Endocrine


Hx Endocrine Disorders:  Yes (OBESITY)


Endocrine Disorders:  Hypothyroidsim, Diabetes, Non-Insulin dep





HEENT


HX ENT Disorders:  Yes (SURGERY FOR DETATCHED RETINA)


Loss of Vision:  Denies


Hearing Impairment:  Denies





Cancer


Hx Cancer:  No





Psychosocial


Hx Psychiatric Problems:  Yes


Behavioral Health Disorders:  Depression





Integumentary


HX Skin/Integumentary Disorder:  No





Blood Transfusions


Hx Blood Disorders:  No


Adverse Reaction to a Blood Tr:  No





Family Medical History


Significant Family History:  Other Conditions/Hx


Family Hx:  


Not obtainable due to adoption





Constitutional:  no symptoms reported


EENTM:  no symptoms reported


Respiratory:  no symptoms reported


Cardiovascular:  no symptoms reported


Gastrointestinal:  dysphagia, heartburn


Genitourinary:  no symptoms reported


Musculoskeletal:  no symptoms reported


Skin:  no symptoms reported





Physical Exam


Vital Signs


Capillary Refill :


General Appearance:  No Apparent Distress


HEENT:  Normal ENT Inspection


Neck:  Normal Inspection


Respiratory:  Lungs Clear


Cardiovascular:  Regular Rate, Rhythm


Gastrointestinal:  Non Tender, Soft


Neurologic/Psychiatric:  Alert, Oriented x3


Skin:  Warm/Dry





Assessment/Plan


Assessment and Plan


lady with recurrent dysphagia.  Previous esophageal stricture.  For upper 

endoscopy and possible balloon dilatation


Problems:  











CARLOS MYRICK MD Jul 24, 2017 9:57 am

## 2017-07-24 NOTE — DISCHARGE INST-SIMPLE/STANDARD
Discharge Inst-Standard


Discharge Medications


New, Converted or Re-Newed RX:  Other





Patient Instructions/Follow Up


Plan of Care/Instructions/FU:  


my office staff will schedule an endoscopic ultrasound and get in touch


with her


Activity as Tolerated:  Yes


Discharge Diet:  No Restrictions











CARLOS STEEN MD Jul 24, 2017 11:34 am

## 2017-07-24 NOTE — ENDO PROCEDURE RECORD
Endo Procedure Report


Date of Procedure


Jul 24, 2017


Surgeon (s)


CARLOS STEEN MD





Post Procedure/Op Diagnosis





2 mm submucosal nodule at the distal esophagus.  Stomach and duodenum


normal





Procedure Performed





EGD with antral biopsy





Description of Procedure


Anesthesia Type:  Conscious Sedation


Specimen(s) collected/removed


antral mucosa


Description of the Procedure


Indication for the procedure: This lady came in for an upper endoscopy to 

evaluate intermittent dysphagia and in preparation for gastric sleeve operation

, to manage morbid obesity.  Informed consent was obtained after reviewing the 

procedure in detail.





Description of the procedure: She was placed in left lateral decubitus position 

and her vital signs were monitored.  Conscious sedation was achieved using 

Versed and fentanyl.  The flexible gastroscope was then introduced down the 

esophagus, past the stomach, into the proximal duodenum





Findings:  





Esophagus: A 2 mm submucous nodule at the distal end possibly a lipoma.  

Photodocumentation was obtained.   An endoscopic ultrasound would be arranged 

for further evaluation.





stomach and duodenum were normal.  An antral biopsy was obtained for 

Helicobacter status.





She tolerated the procedure well and was taken back to the nursing area in a 

stable condition.





Impression: Intermittent dysphagia.  Small submucosal nodule at the distal 

esophagus.  Helicobacter status pending.


Copies To:   ECHO DUARTE XAVIER M MD Jul 24, 2017 11:32 am

## 2017-10-05 NOTE — STRESS TEST
DATE OF SERVICE:  10/04/2017



LEXISCAN MYOVIEW STRESS TEST REPORT 



REFERRING PHYSICIAN:

Dr. Basilio and Dr. Shore in [facility].  



Baseline heart rate is 85, baseline blood pressure 153/82.  Baseline EKG is

sinus rhythm with no ischemic changes.



In summary, the patient was injected with 10.38 mCi of technetium-99 Myoview and

the resting images were obtained.  Then, the patient received 0.4 mg of Lexiscan

followed by 29.9 mCi of technetium-99 Myoview.  Throughout the test, there were

no EKG changes.



The resting and stressed images were reviewed and compared in the short axis,

horizontal long axis, and vertical long axis views.  Review of the images showed

breast attenuation affecting the quality of the images.  There is mild decreased

uptake involving the mid to apical anterolateral wall with mild reversibility. 

SSS is 4, SDS is 4, TID value is significantly elevated of 1.49.  On the gated

images, the left ventricle appeared to be normal size with normal contractility,

calculated ejection fraction 59%.



CONCLUSION:

1.  The patient tolerated Lexiscan well.

2.  Breast attenuation affecting the quality of the images, mild decreased

uptake at the mid to apical anterolateral wall with subtle reversibility, no

significant ischemia was noted.

3.  Transient ischemic dilatation with TID value 1.49.

4.  Normal left ventricular size with normal contractility.  Calculated ejection

fraction 59%.





Job ID: [id number]

DocumentID: [id number]

Dictated Date:  10/04/2017 15:36:31

Transcription Date: 10/05/2017 08:06:24

Dictated By: SANDY JACKSON MD

## 2017-10-17 NOTE — CARDIAC PROCEDURE NOTE-CS/ASA
Pre-Procedure Note


Pre-Op Procedure Note


H&P Reviewed


The H&P was reviewed, patient examined and no changes noted.


Date H&P Reviewed:  Oct 17, 2017


Time H&P Reviewed:  14:17





Conscious Sedation Pre-Proced


Time Reviewed:  14:17


ASA Class:  3











Airway Mallampati Classification: ([place] appropriate class) I.  II.  III,  IV


 


Lungs 


 


Heart 


 


 ASA score


 


 ASA 1: a normal healthy patient


 


 ASA 2:  a patient with a mild systemic disease (mid diabetes, controlled 

hypertension, obesity 


 


x ASA 3:  a patient with a severe systemic disease that limits activity  (angina

, COPD, prior Myocardial infarction)


 


 ASA 4:  a patient with an incapacitating disease that is a constant threat to 

life (CHF, renal failure)


 


 ASA 5:  a moribund patient not expected to survive 24 hrs.  (ruptured aneurysm)


 


 ASA 6:  a declared brain dead patient whose organs are being harvested.


 


 For emergent operations, add the letter E after the classification








Grade 3


Sedation Plan:  Analgesia, Amnesia, Plan communicated to team members, 

Discussed options with patient/fam, Discussed risks with patient/fam


Note


The patient is an appropriate candidate to undergo the planned procedure, 

sedation, and anesthesia.





The patient immediately re-assessed prior to indication.











SANDY JACKSON MD Oct 17, 2017 14:17

## 2017-10-17 NOTE — DISCHARGE INST-POST CATH
Discharge Inst-CATH


Post Cardiac Cath D/C Inst


Follow Up/Plan


Appointment with Dr Ball's office in 2-4 weeks


CARDIAC CATH DISCHARGE INSTRUCTIONS





*Hold Metformin for 48 hours post heart cath.





ACTIVITY





* Go Home directly and rest.


* Limit activity of the leg (or wrist if it was used) for 7 days including 

aerobics, swimming,


   jogging, bicycling, etc.


* Restrict stair-climbing for 7 days if possible, if not, climb up with your non

-cath leg, then


   bring together on the same step.


* Avoid lifting, pushing, pulling or excessive movement of the affected 

extremity for 7 days.


* Customary sexual activity may be resumed after 2 days-use caution not to use 

a position  


   that strains or causes pain to the affected extremity.


* No driving for 24 hours.


* NO SMOKING. 


* Avoid straining for bowel movements for 7 days.


* Gentle walking on level ground is allowed.


* Returning to work will depend on the type of procedure and the results. Your 

doctor will discuss


   this with you.





CALL YOUR DOCTOR FOR ANY OF THE FOLLOWING:





*If bleeding from the puncture site occurs- Apply gentle pressure to site with 

clean cloth and call


   your doctor or EMS.


* If a knot or lump forms under the skin, increases in size, or causes pain.


* If bruising appears to be worsening or moving further down your leg instead 

of disappearing.


* Temperature above 101 F.





CARE OF YOUR GROIN INCISION;





* Bruising or purple discoloration of the skin near the puncture site is common.


* You may shower only, no bathtub bathing for 5 days.  Be careful to avoid 

slipping as your


   leg may feel stiff.


* If a closure device was used on your femoral artery, please see the attached 

guide regarding


   care of the device and your leg.


* REMOVE the dressing from your groin the next day after your procedure in the 

shower.





CARE OF YOUR WRIST INCISION;





* Bruising or purple discoloration of the skin near the puncture site is common.


* You may shower.


* DO NOT submerge wrist.


* Remove dressing in 24 hours.











SANDY BALL MD Oct 17, 2017 3:06 pm

## 2017-10-17 NOTE — CARDIAC CATH REPORT
Cardiac Cath Report


Physician (s)/Assistant (s)


Physician


SANDY JACKSON MD





Pre-Procedure Diagnosis


Pre-Procedure Diagnosis:  coronary artery disease, abnormal stress





Post-Procedure Note


Procedure Start Date:  Oct 17, 2017


Procedure Start Time:  14:00


Name of Procedure:  


coronary angiogram


Aortic arch angiogram


Findings/Procedure Note


PROCEDURE NOTE:


After explaining the procedure to the patient, all pros and cons were explained,


all questions were answered.  The patient signed the consent and then she  was


placed on the cardiac catheterization laboratory.





The patient was placed on the cardiac catheterization laboratory.


Groin and wrist was prepped SL fashion local anesthesia was used. 


Sheath placed in the radial artery


Dontrell right and left catheter were used to access the coronary system.


Pigtail as advanced without crossing the valve, aortic arch angiogram was then


Aortic arch angiogram 


At the end of the procedure the sheath was removed.








FINDINGS:





Hemodynamics 


Aorta 116/90 mean of 90





ANATOMY:


Left Main is free of obstructive disease


Left Anterior Descending is free of obstructive disease


Left Circumflex is nondominant with no obstructive disease


Right Coronory Artery  is dominant with mild disease


Aortic arch angiogram showed normal large normal great neck vessels no 

dissection or aneurysm





CONCLUSION:


1.  Mild coronary artery disease nonobstructive disease


2.  Normal aortic arch and great neck vessels











DISCUSSION AND RECOMMENDATION:





Medical therapy is recommended to intervention as weren't


Anesthesia Type:  Conscious Sedation


Estimated blood loss (mL):  10 ml


Contrast Amount:  50 ml


Total Radiation Dose:  840 mGy





Post-Procedure Diagnosis


Post-operative diagnosis:  


Coronary artery disease


Hypertension


Hyperlipidemia














SANDY JACKSON MD Oct 17, 2017 15:04

## 2018-04-10 NOTE — DIAGNOSTIC IMAGING REPORT
INDICATION: Routine screening.



COMPARISON: Comparison is made with prior studies from 12/28/2016

and 09/30/2015.



The current study was also evaluated with a Computer Aided

Detection (CAD) system.



FINDINGS: Both breasts are primarily involutional. There is a

small ovoid density noted in the right breast just lateral to the

nipple line at anterior depth. No corresponding density on the

MLO view is seen. Additional views of this area are recommended.

No suspicious microcalcifications are identified. The axillae are

unremarkable.



IMPRESSION: Right breast density. Additional views including spot

compression and rolled CC views are recommended.



ACR BI-RADS Category 0: Incomplete. (Needs additional imaging

evaluation).

Result letter will be mailed to the patient.

Note: At least 10% of breast cancer is not imaged by mammography.



Dictated by: 



  Dictated on workstation # [id number]

## 2018-04-21 NOTE — ED CARDIAC GENERAL
History of Present Illness


General


Chief Complaint:  Chest Pain


Stated Complaint:  CHEST PAIN


Nursing Triage Note:  


PT CO OF CHEST PAIN, STATES HAS BEEN GOING FOR A FEW DAYS, STATES ALSO HAS R 


LOWER ABD PAIN AT TIMES, STATES HAS HAD FOR A FEW DAYS





History of Present Illness


Date Seen by Provider:  2018


Time Seen by Provider:  12:00


Initial Comments


48-year-old  female presents with chest tightness and diffuse 

abdominal pain worse on the right side. She reports her symptoms of been going 

on for over a week, she was concerned and saw her primary care provider 

requesting a CT of her abdomen for diverticulitis. This has not been completed 

yet and the patient is concerned. She denies any nausea vomiting or diarrhea. 

She had bariatric surgery 2018. She reports passing flatus and no 

symptoms of GERD.


Timing/Duration:  intermittent


Severity:  mild


NTG SL PTA:  No


ASA po PTA:  No


Associated Systoms:  Chest Pain; No Cough; Diaphoresis; No Loss of Appetite, No 

Nausea/Vomiting, No Syncope, No Weakness





Allergies and Home Medications


Allergies


Coded Allergies:  


     sulfamethoxazole (Verified  Adverse Reaction, Intermediate, RASH/ITCHING, )


     trimethoprim (Verified  Adverse Reaction, Intermediate, RASH/ITCHING, )





Home Medications


Cholecalciferol 5,000 Unit Tablet, 5,000 UNIT PO DAILY, (Reported)


Cyanocobalamin 1,000 Mcg/Ml Inj, 1,000 MCG INJ MONTHLY, (Reported)


Estradiol 1 Mg Tablet, 1 MG PO DAILY, (Reported)


Fesoterodine Fumarate 4 Mg Tab.sr.24h, 4 MG PO DAILY, (Reported)


Levothyroxine Sodium 50 Mcg Tablet, 50 MCG PO DAILY, (Reported)


Losartan Potassium 50 Mg Tablet, 50 MG PO DAILY, (Reported)


Mirabegron 50 Mg Tab.er.24h, 50 MG PO DAILY, (Reported)


Montelukast Sodium 10 Mg Tablet, 10 MG PO HS, (Reported)


Naltrexone HCl/Bupropion HCl 1 Each Tablet.er, 1 TAB PO BID, (Reported)


Pantoprazole Sodium 40 Mg Tablet.dr, 40 MG PO BID, (Reported)


Spironolactone 100 Mg Tablet, 100 MG PO DAILY, (Reported)


Sucralfate 1 Gm/10 Ml Oral.susp, 1 GM PO TID


   Prescribed by: MARGO PHILLIPS on 18 3928





Patient Home Medication List


Home Medication List Reviewed:  Yes





Review of Systems


Constitutional:  no symptoms reported, see HPI


Cardiovascular:  See HPI, Chest Pain (patient describes more as tightness 

bilaterally, she reports it to be similar to when she's had pneumonia in the 

past.)


Gastrointestinal:  See HPI, Abdominal Pain (generalized); Denies Constipated, 

Denies Diarrhea, Denies Nausea, Denies Poor Appetite, Denies Poor Fluid Intake, 

Denies Vomiting


Genitourinary:  No Symptoms Reported, See HPI


All Other Systems Reviewed


Negative Unless Noted:  Yes





Past Medical-Social-Family Hx


Past Med/Social Hx:  Reviewed Nursing Past Med/Soc Hx


Patient Social History


Alcohol Use:  Denies Use


Recreational Drug Use:  No


Smoking Status:  Never a Smoker


2nd Hand Smoke Exposure:  Yes (AT WORK)


Recent Foreign Travel:  No


Contact w/Someone Who Travel:  No


Recent Infectious Disease Expo:  No


Recent Hopitalizations:  No


Physical Abuse:  No


Sexual Abuse:  No





Immunizations Up To Date


Tetanus Booster (TDap):  Less than 5yrs


PED Vaccines UTD:  Yes





Seasonal Allergies


Seasonal Allergies:  Yes





Past Medical History


Surgeries:  Yes (BACK SURG, GASTRIC SLEEVE)


Bladder Surgery, Hysterectomy, Oophorectomy, Orthopedic, Tonsillectomy


Respiratory:  No (recent sleep study done-needs a cpap machine-waiting)


Cardiac:  Yes


Hypertension


Neurological:  No


Reproductive Disorders:  No


Female Reproductive Disorders:  Endometriosis, Polycystic Ovarian Dis


GYN History:  Hysterectomy


Genitourinary:  Yes


Bladder Infection, Neurogenic Bladder


Gastrointestinal:  Yes (dyspagea)


Gastroesophageal Reflux, Diverticulosis, Ulcer


Musculoskeletal:  Yes (BACK SX FOR HERNIATED DISC)


Chronic Back Pain


Endocrine:  Yes (OBESITY)


Hypothyroidsim, Diabetes, Non-Insulin dep


HEENT:  No


Loss of Vision:  Denies


Hearing Impairment:  Denies


Cancer:  No


Psychosocial:  Yes


Depression


Nursing Suicide Risk Score:  0


Integumentary:  No


Blood Disorders:  No


Adverse Reaction/Blood Tranf:  No





Family Medical History





Not obtainable due to adoption


Other Conditions/Hx





Physical Exam


Vital Signs





Vital Signs - First Documented








 18





 11:41


 


Temp 94.7


 


Pulse 92


 


Resp 18


 


B/P (MAP) 114/95 (101)


 


Pulse Ox 98





Capillary Refill : Less Than 3 Seconds


General Appearance:  No Apparent Distress, WD/WN


HEENT:  PERRL/EOMI, TMs Normal, Normal ENT Inspection, Pharynx Normal


Neck:  Full Range of Motion, Normal Inspection, Non Tender, Supple


Respiratory:  Chest Non Tender, Lungs Clear, Normal Breath Sounds


Cardiovascular:  Regular Rate, Rhythm, No Edema, Normal Peripheral Pulses


Gastrointestinal:  Normal Bowel Sounds, No Organomegaly, No Pulsatile Mass, Soft

; No Distended, No Guarding; Tenderness (general lysed right upper and lower 

quadrant with some radiation to the flank)


Neurologic/Psychiatric:  Alert, Oriented x3, No Motor/Sensory Deficits, Normal 

Mood/Affect


Skin:  Normal Color, Warm/Dry





Progress/Results/Core Measures


Lab Results





Laboratory Tests








Test


 18


12:00 18


12:11 Range/Units


 


 


White Blood Count


 9.1 


 


 4.3-11.0


10^3/uL


 


Red Blood Count


 4.90 


 


 4.35-5.85


10^6/uL


 


Hemoglobin 14.2   11.5-16.0  G/DL


 


Hematocrit 41   35-52  %


 


Mean Corpuscular Volume 84   80-99  FL


 


Mean Corpuscular Hemoglobin 29   25-34  PG


 


Mean Corpuscular Hemoglobin


Concent 34 


 


 32-36  G/DL





 


Red Cell Distribution Width 13.7   10.0-14.5  %


 


Platelet Count


 270 


 


 130-400


10^3/uL


 


Mean Platelet Volume 10.8 H  7.4-10.4  FL


 


Neutrophils (%) (Auto) 56   42-75  %


 


Lymphocytes (%) (Auto) 31   12-44  %


 


Monocytes (%) (Auto) 11   0-12  %


 


Eosinophils (%) (Auto) 2   0-10  %


 


Basophils (%) (Auto) 0   0-10  %


 


Neutrophils # (Auto) 5.1   1.8-7.8  X 10^3


 


Lymphocytes # (Auto) 2.8   1.0-4.0  X 10^3


 


Monocytes # (Auto) 1.0   0.0-1.0  X 10^3


 


Eosinophils # (Auto)


 0.1 


 


 0.0-0.3


10^3/uL


 


Basophils # (Auto)


 0.0 


 


 0.0-0.1


10^3/uL


 


Prothrombin Time 13.8   12.2-14.7  SEC


 


INR Comment 1.1   0.8-1.4  


 


Activated Partial


Thromboplast Time 25 


 


 24-35  SEC





 


Sodium Level 135   135-145  MMOL/L


 


Potassium Level 3.5 L  3.6-5.0  MMOL/L


 


Chloride Level 99     MMOL/L


 


Carbon Dioxide Level 24   21-32  MMOL/L


 


Anion Gap 12   5-14  MMOL/L


 


Blood Urea Nitrogen 20 H  7-18  MG/DL


 


Creatinine


 0.87 


 


 0.60-1.30


MG/DL


 


Estimat Glomerular Filtration


Rate > 60 


 


  





 


BUN/Creatinine Ratio 23    


 


Glucose Level 97     MG/DL


 


Calcium Level 9.9   8.5-10.1  MG/DL


 


Magnesium Level 1.9   1.8-2.4  MG/DL


 


Total Bilirubin 1.0   0.1-1.0  MG/DL


 


Aspartate Amino Transf


(AST/SGOT) 23 


 


 5-34  U/L





 


Alanine Aminotransferase


(ALT/SGPT) 40 


 


 0-55  U/L





 


Alkaline Phosphatase 114     U/L


 


Myoglobin


 40.7 


 


 10.0-92.0


NG/ML


 


Troponin I < 0.30   <0.30  NG/ML


 


Total Protein 8.2   6.4-8.2  GM/DL


 


Albumin 4.4   3.2-4.5  GM/DL


 


Urine Color  YELLOW   


 


Urine Clarity


 


 SLIGHTLY


CLOUDY  





 


Urine pH  7  5-9  


 


Urine Specific Gravity  1.010 L 1.016-1.022  


 


Urine Protein  NEGATIVE  NEGATIVE  


 


Urine Glucose (UA)  NEGATIVE  NEGATIVE  


 


Urine Ketones  NEGATIVE  NEGATIVE  


 


Urine Nitrite  NEGATIVE  NEGATIVE  


 


Urine Bilirubin  NEGATIVE  NEGATIVE  


 


Urine Urobilinogen  NORMAL  NORMAL  MG/DL


 


Urine Leukocyte Esterase  NEGATIVE  NEGATIVE  


 


Urine RBC (Auto)  NEGATIVE  NEGATIVE  


 


Urine RBC  NONE   /HPF


 


Urine WBC  RARE   /HPF


 


Urine Squamous Epithelial


Cells 


 25-50 H


  /HPF





 


Urine Crystals  NONE   /LPF


 


Urine Bacteria  TRACE   /HPF


 


Urine Casts  NONE   /LPF


 


Urine Mucus  NEGATIVE   /LPF


 


Urine Culture Indicated  NO   








My Orders





Orders - ALANMARGO ARNP


Cbc With Automated Diff (18 11:55)


Magnesium (18 11:55)


Chest 1 View, Ap/Pa Only (18 11:55)


Ekg Tracing (18 11:55)


Cardiac Profile 1 (18 11:55)


Comprehensive Metabolic Panel (18 11:55)


Myoglobin Serum (18 11:55)


Protime With Inr (18 11:55)


Partial Thromboplastin Time (18 11:55)


O2 (18 11:55)


Monitor-Rhythm Ecg Trace Only (18 11:55)


Saline Lock/Iv-Start (18 11:55)


Ua Culture If Indicated (18 12:03)


Lidocaine 2% Viscous 15 Ml (Xylocaine Vi (18 13:00)


Antacid  Suspension (Mylanta  Suspension (18 13:00)





Medications Given in ED





Current Medications








 Medications  Dose


 Ordered  Sig/Juan


 Route  Start Time


 Stop Time Status Last Admin


Dose Admin


 


 Al Hydrox/Mg


 Hydrox/Simethicone  30 ml  ONCE  ONCE


 PO  18 13:00


 18 13:01 DC 18 13:41


30 ML


 


 Lidocaine HCl  15 ml  ONCE  ONCE


 PO  18 13:00


 18 13:01 DC 18 13:41


15 ML








Vital Signs/I&O











 18





 11:41 14:13


 


Temp 94.7 


 


Pulse 92 103


 


Resp 18 18


 


B/P (MAP) 114/95 (101) 111/99 (101)


 


Pulse Ox 98 97














Blood Pressure Mean:  101








Progress Note :  


   Time:  12:00


Progress Note


Initial evaluation completed, recommended cardiac labs, UA, aspirin 324 mg 

chewable and continue monitoring.


1245 reviewed labs with patient, all essentially normal. Chest x-ray normal. 

Discussed with patient in detail that her labs and exam are not indicative for 

a CT of the abdomen and pelvis.


1315 patient reexamined, no tenderness to palpation in the abdomen and no CVA 

tenderness on the right. She is now complaining more of GERD symptoms. Will try 

a GI cocktail.


1345 patient reports improvement of her symptoms after taking the GI cocktail. 

Discharge instructions and return precautions reviewed with the patient.


Initial ECG Impression Date:  2018


Initial ECG Impression Time:  11:46


Initial ECG Rate:  88


Initial ECG Rhythm:  Normal Sinus, PVC


Initial ECG Intervals:  Normal


Initial ECG Intervals


, QRS T 94, , QTc 465. Axis P 39, QRS 8, T -62.


Initial ECG Impression:  Normal


Initial ECG Comparisson:  Unchanged


Comment


Reviewed EKG with Dr. Rivero, concurred with interpretation.





   Diagonstic Imaging:  Xray


   Plain Films/CT/US/NM/MRI:  chest


Comments


NAME:   STEPHY REDD


MED REC#:   [id number]


ACCOUNT#:   [id number]


PT STATUS:   REG ER


:   1970


PHYSICIAN:   MARGO PHILLIPS


ADMIT DATE:   18/ER


 ***Draft***


Date of Exam:18





CHEST 1 VIEW, AP/PA ONLY








INDICATION: Chest pain. Shortness of breath.





COMPARISON: Comparison is made with prior examination from


2015.





FINDINGS: The heart size, mediastinal configuration, and


pulmonary vascularity are within normal limits. There is no


pleural effusion, pneumothorax, or pneumonia. The osseous


structures are unremarkable. 





IMPRESSION: No acute cardiopulmonary abnormality.





  Dictated on workstation # [id number]








Dict:   18 1211


Trans:   18 1217


[facility] [id number]





Interpreted by:     DANA ARMENDARIZ MD


Electronically signed by:





Departure


Impression





 Primary Impression:  


 Gastroesophageal reflux disease


 Qualified Codes:  K21.9 - Gastro-esophageal reflux disease without esophagitis


Disposition:   HOME, SELF-CARE


Condition:  Stable





Departure-Patient Inst.


Decision time for Depature:  13:45


Referrals:  


ECHO DUARTE DO (PCP/Family)


Primary Care Physician


Patient Instructions:  Acid Reflux (Gastroesophageal Reflux Disease), Adult (DC)





Add. Discharge Instructions:  


Continue taking her Protonix which is a proton pump inhibitor, add Pepcid 10 mg 

twice daily, over-the-counter, this is a histamine 2 blocker.


Follow-up with your primary care provider if symptoms are not improving or 

worsen.


Return to emergency department for new problems or concerns.





All discharge instructions reviewed with patient and/or family. Voiced 

understanding.


Scripts


Sucralfate (Carafate) 1 Gm/10 Ml Oral.susp


1 GM PO TID, #210 ML 0 Refills


   Prov: MARGO PHILLIPS         18





Copy


Copies To 1:   ECHO DUARTE AMY ARNP 2018 12:38

## 2018-04-21 NOTE — DIAGNOSTIC IMAGING REPORT
INDICATION: Chest pain. Shortness of breath.



COMPARISON: Comparison is made with prior examination from

02/23/2015.



FINDINGS: The heart size, mediastinal configuration, and

pulmonary vascularity are within normal limits. There is no

pleural effusion, pneumothorax, or pneumonia. The osseous

structures are unremarkable. 



IMPRESSION: No acute cardiopulmonary abnormality.



Dictated by: 



  Dictated on workstation # [id number]

## 2018-05-16 NOTE — DIAGNOSTIC IMAGING REPORT
PROCEDURE: 

US Gallbladder.



TECHNIQUE: 

Multiple Real-time grayscale images were obtained over the right

upper quadrant in various projections.



INDICATION:  

Right upper quadrant pain.



FINDINGS:

The pancreas is poorly visualized due to patient body habitus and

bowel gas. The liver is enlarged at 21.3 cm. No discrete liver

mass is detected. The gallbladder is without stones or sludge. No

wall thickening or pericholecystic fluid is seen. The

extrahepatic bile duct is obscured. No intrahepatic ductal

dilatation is seen. The right kidney is unremarkable. There is no

ascites.



IMPRESSION:

1. Hepatomegaly.

2. No evidence of cholelithiasis or acute cholecystitis.



Dictated by: 



  Dictated on workstation # [id number]

## 2018-06-29 NOTE — DIAGNOSTIC IMAGING REPORT
PROCEDURE: US abdomen complete.



TECHNIQUE: Multiple real-time grayscale images were obtained over

the abdomen in various projections.



INDICATION: Right abdominal pain



Liver measures 22 cm in length without evidence of focal

abnormality. Gallbladder wall thickness is 0.3 cm. There is no

evidence of intraluminal filling defect or pericholecystic fluid.

Large portions of the gallbladder and pancreas are obscured by

overlying bowel. There is no evidence of splenic abnormality.

Retroperitoneum is also obscured without evidence of aortic,

inferior vena caval or renal abnormality. There is no evidence of

hydronephrosis or perinephric fluid collection. No free fluid is

seen in the abdomen.



IMPRESSION: No acute abnormality identified, however, the amount

of bowel gas does limit evaluation.



Dictated by: 



  Dictated on workstation # [id number]

## 2018-07-13 NOTE — DIAGNOSTIC IMAGING REPORT
INDICATION: Abdominal pain, greatest on the right side.



TIME OF EXAM: 2:46 PM



FINDINGS: The bowel gas pattern is nonobstructive. No free air is

seen. No pathologic calcifications are seen. There appears to be

stimulator in the left pelvis.



IMPRESSION: No acute features detected.



Dictated by: 



  Dictated on workstation # [id number]

## 2019-07-31 NOTE — DIAGNOSTIC IMAGING REPORT
INDICATION: Routine screening.



Comparison is made with prior mammograms from 04/09/2018 and

12/28/2016.



2-D and 3-D bilateral screening mammography was performed.



The current study was also evaluated with a Computer Aided

Detection (CAD) system. 3-D tomosynthesis was also performed and

reviewed.



FINDINGS: Scattered fibroglandular densities are identified

bilaterally. The parenchymal pattern is stable. No dominant mass

or malignant-appearing microcalcifications are seen. The axillae

are unremarkable.



IMPRESSION: No mammographic features suspicious for malignancy

are identified.



ACR BI-RADS Category 1: Negative.

Result letter will be mailed to the patient.

Note:  At least 10% of breast cancer is not imaged by

mammography.



Dictated by: 



  Dictated on workstation # [id number]

## 2020-08-10 NOTE — DIAGNOSTIC IMAGING REPORT
INDICATION: 

Routine screening.



COMPARISON:      

07/31/2019 and 04/09/2018.



TECHNIQUE: 

2D and 3D bilateral screening mammography was performed with CAD.



FINDINGS:

Scattered fibroglandular densities are identified bilaterally.

The parenchymal pattern is stable. No mass or malignant appearing

microcalcifications are seen. The axillae are unremarkable.



IMPRESSION: 

No mammographic features suspicious for malignancy are

identified.



ACR BI-RADS Category 1: Negative.

Result letter will be mailed to the patient.

Note:  At least 10% of breast cancer is not imaged by

mammography.



Dictated by: 



  Dictated on workstation # [id number]

## 2021-04-14 ENCOUNTER — HOSPITAL ENCOUNTER (OUTPATIENT)
Dept: HOSPITAL 75 - PREOP | Age: 51
Discharge: HOME | End: 2021-04-14
Attending: SPECIALIST
Payer: COMMERCIAL

## 2021-04-14 VITALS — WEIGHT: 250.45 LBS | HEIGHT: 69.02 IN | BODY MASS INDEX: 37.09 KG/M2

## 2021-04-14 DIAGNOSIS — Z01.818: Primary | ICD-10-CM

## 2021-04-16 ENCOUNTER — HOSPITAL ENCOUNTER (OUTPATIENT)
Dept: HOSPITAL 75 - SDC | Age: 51
Discharge: HOME | End: 2021-04-16
Attending: SPECIALIST
Payer: COMMERCIAL

## 2021-04-16 VITALS — DIASTOLIC BLOOD PRESSURE: 63 MMHG | SYSTOLIC BLOOD PRESSURE: 121 MMHG

## 2021-04-16 VITALS — WEIGHT: 250.45 LBS | HEIGHT: 68.98 IN | BODY MASS INDEX: 37.09 KG/M2

## 2021-04-16 VITALS — DIASTOLIC BLOOD PRESSURE: 84 MMHG | SYSTOLIC BLOOD PRESSURE: 139 MMHG

## 2021-04-16 DIAGNOSIS — Z88.2: ICD-10-CM

## 2021-04-16 DIAGNOSIS — Z79.899: ICD-10-CM

## 2021-04-16 DIAGNOSIS — Z88.1: ICD-10-CM

## 2021-04-16 DIAGNOSIS — M79.7: ICD-10-CM

## 2021-04-16 DIAGNOSIS — Z83.3: ICD-10-CM

## 2021-04-16 DIAGNOSIS — H25.11: Primary | ICD-10-CM

## 2021-04-16 PROCEDURE — 66984 XCAPSL CTRC RMVL W/O ECP: CPT

## 2021-04-16 RX ADMIN — TROPICAMIDE SCH ML: 10 SOLUTION/ DROPS OPHTHALMIC at 12:07

## 2021-04-16 RX ADMIN — TETRACAINE HYDROCHLORIDE PRN ML: 5 SOLUTION OPHTHALMIC at 11:49

## 2021-04-16 RX ADMIN — TETRACAINE HYDROCHLORIDE PRN ML: 5 SOLUTION OPHTHALMIC at 12:07

## 2021-04-16 RX ADMIN — PHENYLEPHRINE HYDROCHLORIDE SCH ML: 100 SOLUTION/ DROPS OPHTHALMIC at 12:02

## 2021-04-16 RX ADMIN — TROPICAMIDE SCH ML: 10 SOLUTION/ DROPS OPHTHALMIC at 12:02

## 2021-04-16 RX ADMIN — TETRACAINE HYDROCHLORIDE PRN ML: 5 SOLUTION OPHTHALMIC at 12:12

## 2021-04-16 RX ADMIN — TROPICAMIDE SCH ML: 10 SOLUTION/ DROPS OPHTHALMIC at 12:12

## 2021-04-16 RX ADMIN — TETRACAINE HYDROCHLORIDE PRN ML: 5 SOLUTION OPHTHALMIC at 12:02

## 2021-04-16 RX ADMIN — PHENYLEPHRINE HYDROCHLORIDE SCH ML: 100 SOLUTION/ DROPS OPHTHALMIC at 12:07

## 2021-04-16 RX ADMIN — PHENYLEPHRINE HYDROCHLORIDE SCH ML: 100 SOLUTION/ DROPS OPHTHALMIC at 12:12

## 2021-04-16 NOTE — OPHTHALMOLOGY OPERATIVE REPORT
Cataract removal/placement IOL


PREOPERATIVE DIAGNOSIS: Cataract Right Eye


POSTOPERATIVE DIAGNOSIS: Cataract Right Eye





PROCEDURE: Cataract removal and placement of posterior chamber implant, right 

eye





SURGEON: Trevor Francois 





ANESTHESIA: Topical with sedation





COMPLICATIONS: None





ESTIMATED BLOOD LOSS: Minimal 





DESCRIPTION OF PROCEDURE:


After proper informed consent was obtained, the patient, a 51 female, was taken 

to the Operating Room and the right eye was anesthetized with tetracaine.  The 

right eye was then prepped and draped in the usual manner.  A wire lid speculum 

was placed. A paracentesis was made at the left hand position. Preservative free

lidocaine was injected into the anterior chamber followed by viscoelastic.  A 

clear corneal incision was made in the temporal position. A capsulorrhexis was 

preformed and the central nuclear and cortical material were removed.  The 

posterior capsule was polished and Eric 6.0 AU00T0  IOL was placed into the 

capsular bag. The residual viscoelastic was aspirated and balanced saline 

solution was injected into the anterior chamber. Moxifloxacin  was injected into

the anterior chamber.





The wound was checked and found to be water tight.





The patient tolerated the procedure well without complications.











TREVOR FRANCOIS MD             Apr 16, 2021 12:34

## 2021-04-16 NOTE — OPHTHALMOLOGIST PRE-OP NOTE
Pre-Operative Progress Note


H&P Reviewed


The H&P was reviewed, patient examined and no changes noted.


Date H&P Reviewed:  Apr 16, 2021


Time H&P Reviewed:  11:54


Pre-Op Dx


Cataract, Right Eye











RUBI FRANCOIS MD             Apr 16, 2021 11:54

## 2021-04-16 NOTE — ANESTHESIA-GENERAL POST-OP
MAC


Patient Condition


Mental Status/LOC:  Same as Preop


Cardiovascular:  Satisfactory


Nausea/Vomiting:  Absent


Respiratory:  Satisfactory


Pain:  Controlled


Complications:  Absent





Post Op Complications


Complications


None





Follow Up Care/Instructions


Patient Instructions


None needed.





Anesthesiology Discharge Order


Discharge Order


Patient is doing well, no complaints, stable vital signs, no apparent adverse 

anesthesia problems.   


No complications reported per nursing.











WILLIAM RANDHAWA CRNA            Apr 16, 2021 12:42

## 2021-05-07 ENCOUNTER — HOSPITAL ENCOUNTER (OUTPATIENT)
Dept: HOSPITAL 75 - SDC | Age: 51
Discharge: HOME | End: 2021-05-07
Attending: SPECIALIST
Payer: COMMERCIAL

## 2021-05-07 VITALS — SYSTOLIC BLOOD PRESSURE: 134 MMHG | DIASTOLIC BLOOD PRESSURE: 85 MMHG

## 2021-05-07 VITALS — HEIGHT: 68.9 IN | WEIGHT: 250.45 LBS | BODY MASS INDEX: 37.09 KG/M2

## 2021-05-07 VITALS — DIASTOLIC BLOOD PRESSURE: 80 MMHG | SYSTOLIC BLOOD PRESSURE: 131 MMHG

## 2021-05-07 DIAGNOSIS — E66.9: ICD-10-CM

## 2021-05-07 DIAGNOSIS — Z88.1: ICD-10-CM

## 2021-05-07 DIAGNOSIS — Z98.890: ICD-10-CM

## 2021-05-07 DIAGNOSIS — H25.12: Primary | ICD-10-CM

## 2021-05-07 DIAGNOSIS — I10: ICD-10-CM

## 2021-05-07 DIAGNOSIS — Z88.2: ICD-10-CM

## 2021-05-07 DIAGNOSIS — Z79.899: ICD-10-CM

## 2021-05-07 PROCEDURE — 66984 XCAPSL CTRC RMVL W/O ECP: CPT

## 2021-05-07 RX ADMIN — TROPICAMIDE SCH ML: 10 SOLUTION/ DROPS OPHTHALMIC at 11:42

## 2021-05-07 RX ADMIN — TETRACAINE HYDROCHLORIDE PRN ML: 5 SOLUTION OPHTHALMIC at 11:22

## 2021-05-07 RX ADMIN — TETRACAINE HYDROCHLORIDE PRN ML: 5 SOLUTION OPHTHALMIC at 11:42

## 2021-05-07 RX ADMIN — TETRACAINE HYDROCHLORIDE PRN ML: 5 SOLUTION OPHTHALMIC at 11:28

## 2021-05-07 RX ADMIN — PHENYLEPHRINE HYDROCHLORIDE SCH ML: 100 SOLUTION/ DROPS OPHTHALMIC at 11:28

## 2021-05-07 RX ADMIN — PHENYLEPHRINE HYDROCHLORIDE SCH ML: 100 SOLUTION/ DROPS OPHTHALMIC at 11:42

## 2021-05-07 RX ADMIN — PHENYLEPHRINE HYDROCHLORIDE SCH ML: 100 SOLUTION/ DROPS OPHTHALMIC at 11:35

## 2021-05-07 RX ADMIN — TETRACAINE HYDROCHLORIDE PRN ML: 5 SOLUTION OPHTHALMIC at 11:35

## 2021-05-07 RX ADMIN — TROPICAMIDE SCH ML: 10 SOLUTION/ DROPS OPHTHALMIC at 11:35

## 2021-05-07 RX ADMIN — TROPICAMIDE SCH ML: 10 SOLUTION/ DROPS OPHTHALMIC at 11:28

## 2021-05-07 NOTE — ANESTHESIA-GENERAL POST-OP
MAC


Patient Condition


Mental Status/LOC:  Same as Preop


Cardiovascular:  Satisfactory


Nausea/Vomiting:  Absent


Respiratory:  Satisfactory


Pain:  Controlled


Complications:  Absent





Post Op Complications


Complications


None





Follow Up Care/Instructions


Patient Instructions


None needed.





Anesthesiology Discharge Order


Discharge Order


Patient is doing well, no complaints, stable vital signs, no apparent adverse 

anesthesia problems.   


No complications reported per nursing.











ISRAEL MARLEY CRNA           May 7, 2021 13:04

## 2021-05-07 NOTE — OPHTHALMOLOGIST PRE-OP NOTE
Pre-Operative Progress Note


H&P Reviewed


The H&P was reviewed, patient examined and no changes noted.


Date H&P Reviewed:  May 7, 2021


Time H&P Reviewed:  11:46


Pre-Op Dx


Cataract, Left Eye











RUBI FRANCOIS MD              May 7, 2021 11:46

## 2021-05-07 NOTE — OPHTHALMOLOGY OPERATIVE REPORT
Cataract removal/placement IOL


PREOPERATIVE DIAGNOSIS:    Cataract Left Eye


POSTOPERATIVE DIAGNOSIS: Cataract Left Eye





PROCEDURE: Cataract removal and placement of posterior chamber implant, left eye





SURGEON: Trevor Francois 





ANESTHESIA: Topical with sedation





COMPLICATIONS: None





ESTIMATED BLOOD LOSS: Minimal 





DESCRIPTION OF PROCEDURE:


After proper informed consent was obtained, the patient, a 51 female, was taken 

to the Operating Room and the left eye was anesthetized with tetracaine.  The 

left eye was then prepped and draped in the usual manner.  A wire lid speculum 

was placed. A paracentesis was made at the left hand position. Preservative free

lidocaine was injected into the anterior chamber followed by viscoelastic.  A 

clear corneal incision was made in the temporal position. A capsulorrhexis was 

preformed and the central nuclear and cortical material were removed.  The 

posterior capsule was polished and an Eric 7.5 [id number] was placed into the 

capsular bag. The residual viscoelastic was aspirated and balanced saline 

solution was injected into the anterior chamber.  Moxifloxacin was injected into

the anterior chamber.





The wound was checked and found to be water tight.





The patient tolerated the procedure well without complications.











TREVOR FRANCOIS MD              May 7, 2021 12:09

## 2021-07-11 ENCOUNTER — HOSPITAL ENCOUNTER (EMERGENCY)
Dept: HOSPITAL 75 - ER | Age: 51
Discharge: HOME | End: 2021-07-11
Payer: COMMERCIAL

## 2021-07-11 VITALS — DIASTOLIC BLOOD PRESSURE: 74 MMHG | SYSTOLIC BLOOD PRESSURE: 154 MMHG

## 2021-07-11 VITALS — BODY MASS INDEX: 38.5 KG/M2 | HEIGHT: 68.9 IN | WEIGHT: 259.93 LBS

## 2021-07-11 DIAGNOSIS — E11.9: ICD-10-CM

## 2021-07-11 DIAGNOSIS — Z79.899: ICD-10-CM

## 2021-07-11 DIAGNOSIS — Z20.822: ICD-10-CM

## 2021-07-11 DIAGNOSIS — E66.9: ICD-10-CM

## 2021-07-11 DIAGNOSIS — I10: ICD-10-CM

## 2021-07-11 DIAGNOSIS — R53.83: Primary | ICD-10-CM

## 2021-07-11 LAB
ALBUMIN SERPL-MCNC: 3.9 GM/DL (ref 3.2–4.5)
ALP SERPL-CCNC: 63 U/L (ref 40–136)
ALT SERPL-CCNC: 13 U/L (ref 0–55)
APTT PPP: YELLOW S
BACTERIA #/AREA URNS HPF: NEGATIVE /HPF
BASOPHILS # BLD AUTO: 0 10^3/UL (ref 0–0.1)
BASOPHILS NFR BLD AUTO: 0 % (ref 0–10)
BILIRUB SERPL-MCNC: 0.2 MG/DL (ref 0.1–1)
BILIRUB UR QL STRIP: NEGATIVE
BUN/CREAT SERPL: 19
CALCIUM SERPL-MCNC: 8.9 MG/DL (ref 8.5–10.1)
CHLORIDE SERPL-SCNC: 108 MMOL/L (ref 98–107)
CO2 SERPL-SCNC: 23 MMOL/L (ref 21–32)
CREAT SERPL-MCNC: 0.74 MG/DL (ref 0.6–1.3)
EOSINOPHIL # BLD AUTO: 0.2 10^3/UL (ref 0–0.3)
EOSINOPHIL NFR BLD AUTO: 2 % (ref 0–10)
FIBRINOGEN PPP-MCNC: CLEAR MG/DL
GFR SERPLBLD BASED ON 1.73 SQ M-ARVRAT: > 60 ML/MIN
GLUCOSE SERPL-MCNC: 100 MG/DL (ref 70–105)
GLUCOSE UR STRIP-MCNC: NEGATIVE MG/DL
HCT VFR BLD CALC: 40 % (ref 35–52)
HGB BLD-MCNC: 13 G/DL (ref 11.5–16)
KETONES UR QL STRIP: NEGATIVE
LEUKOCYTE ESTERASE UR QL STRIP: NEGATIVE
LYMPHOCYTES # BLD AUTO: 3.6 10^3/UL (ref 1–4)
LYMPHOCYTES NFR BLD AUTO: 39 % (ref 12–44)
MANUAL DIFFERENTIAL PERFORMED BLD QL: NO
MCH RBC QN AUTO: 31 PG (ref 25–34)
MCHC RBC AUTO-ENTMCNC: 33 G/DL (ref 32–36)
MCV RBC AUTO: 96 FL (ref 80–99)
MONOCYTES # BLD AUTO: 0.7 10^3/UL (ref 0–1)
MONOCYTES NFR BLD AUTO: 8 % (ref 0–12)
NEUTROPHILS # BLD AUTO: 4.6 10^3/UL (ref 1.8–7.8)
NEUTROPHILS NFR BLD AUTO: 51 % (ref 42–75)
NITRITE UR QL STRIP: NEGATIVE
PH UR STRIP: 6 [PH] (ref 5–9)
PLATELET # BLD: 214 10^3/UL (ref 130–400)
PMV BLD AUTO: 9.9 FL (ref 9–12.2)
POTASSIUM SERPL-SCNC: 4 MMOL/L (ref 3.6–5)
PROT SERPL-MCNC: 7.2 GM/DL (ref 6.4–8.2)
PROT UR QL STRIP: NEGATIVE
RBC #/AREA URNS HPF: (no result) /HPF
SODIUM SERPL-SCNC: 140 MMOL/L (ref 135–145)
SP GR UR STRIP: 1.02 (ref 1.02–1.02)
SQUAMOUS #/AREA URNS HPF: (no result) /HPF
WBC # BLD AUTO: 9.1 10^3/UL (ref 4.3–11)
WBC #/AREA URNS HPF: (no result) /HPF

## 2021-07-11 PROCEDURE — 86141 C-REACTIVE PROTEIN HS: CPT

## 2021-07-11 PROCEDURE — 83880 ASSAY OF NATRIURETIC PEPTIDE: CPT

## 2021-07-11 PROCEDURE — 85379 FIBRIN DEGRADATION QUANT: CPT

## 2021-07-11 PROCEDURE — 85025 COMPLETE CBC W/AUTO DIFF WBC: CPT

## 2021-07-11 PROCEDURE — 84443 ASSAY THYROID STIM HORMONE: CPT

## 2021-07-11 PROCEDURE — 36415 COLL VENOUS BLD VENIPUNCTURE: CPT

## 2021-07-11 PROCEDURE — 80053 COMPREHEN METABOLIC PANEL: CPT

## 2021-07-11 PROCEDURE — 84145 PROCALCITONIN (PCT): CPT

## 2021-07-11 PROCEDURE — 87636 SARSCOV2 & INF A&B AMP PRB: CPT

## 2021-07-11 PROCEDURE — 81000 URINALYSIS NONAUTO W/SCOPE: CPT

## 2021-07-11 PROCEDURE — 71045 X-RAY EXAM CHEST 1 VIEW: CPT

## 2021-07-11 NOTE — DIAGNOSTIC IMAGING REPORT
INDICATION:  cough.  



TECHNIQUE:  Single view chest 2:47 PM.



CORRELATION STUDY:  04/21/2018



FINDINGS: 

The heart size, mediastinal configuration and pulmonary

vascularity are within normal limits.  

The lungs are clear with no consolidating infiltrate. There is no

significant effusion or pneumothorax. 



IMPRESSION: 

1. Negative portable chest.



Dictated by: 



  Dictated on workstation # [id number]

## 2021-07-11 NOTE — ED GENERAL
General


Chief Complaint:  Fever-Adult/Adol


Stated Complaint:  FEVER/WEAKNESS/SOB


Source of Information:  Patient


Exam Limitations:  No Limitations





History of Present Illness


Date Seen by Provider:  2021


Time Seen by Provider:  14:33


Initial Comments


Here with report of fatigue as well as weakness and some shortness of breath.  

She denies actually having fever and states 98.7 was her highest temperature.  

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

 She apparently took somebody to [employer] yesterday to get tested for COVID-19.

 That person was ultimately positive.  She was also tested yesterday and it was 

negative.  She has been having the symptoms for a week now.  She has been 

vaccinated in April with the Pfizer vaccine and has received both doses.  States

onset 1 week ago and then got a little better and then got worse again on Friday

and Saturday and today.  Denies dysuria or diarrhea.  Eating and drinking okay. 

Does have a runny nose that started this morning.  The contact yesterday was in 

her car.  She and the other person were both mask and she was in the front seat 

by the other person was in the backseat.


Timing/Duration:  1 Week, Getting Worse


Severity:  Moderate


Associated Systoms:  Cough; No Fever/Chills, No Headaches; Loss of Appetite, 

Malaise; No Nausea/Vomiting, No Rash; Shortness of Air, Weakness





Allergies and Home Medications


Allergies


Coded Allergies:  


     sulfamethoxazole (Verified  Adverse Reaction, Intermediate, RASH/ITCHING, 

17)


     trimethoprim (Verified  Adverse Reaction, Intermediate, RASH/ITCHING, 

17)





Home Medications


Estradiol 1 Mg Tablet, 1 MG PO DAILY, (Reported)


Losartan Potassium 100 Mg Tablet, 100 MG PO DAILY, (Reported)


Spironolactone 100 Mg Tablet, 100 MG PO DAILY, (Reported)





Patient Home Medication List


Home Medication List Reviewed:  Yes





Review of Systems


Review of Systems


Constitutional:  see HPI; No chills, No fever


EENTM:  see HPI; No throat pain


Respiratory:  see HPI


Cardiovascular:  No chest pain, No edema


Gastrointestinal:  No abdominal pain, No nausea, No vomiting


Genitourinary:  no symptoms reported





All Other Systems Reviewed


Negative Unless Noted:  Yes





Past Medical-Social-Family Hx


Patient Social History


Tobacco Use?:  No


Substance use?:  No


Alcohol Use?:  No





Immunizations Up To Date


Tetanus Booster (TDap):  Less than 5yrs


PED Vaccines UTD:  Yes





Seasonal Allergies


Seasonal Allergies:  Yes





Past Medical History


Surgeries:  Yes (BACK SURG, GASTRIC SLEEVE)


Bladder Surgery, Hysterectomy, Oophorectomy, Orthopedic, Tonsillectomy


Respiratory:  No (recent sleep study done-needs a cpap machine-waiting)


Cardiac:  Yes


Hypertension


Neurological:  No


Reproductive Disorders:  No


Female Reproductive Disorders:  Endometriosis, Polycystic Ovarian Dis


GYN History:  Hysterectomy


Genitourinary:  Yes


Bladder Infection, Neurogenic Bladder


Gastrointestinal:  Yes (dyspagea)


Gastroesophageal Reflux, Diverticulosis, Ulcer


Musculoskeletal:  Yes (BACK SX FOR HERNIATED DISC)


Chronic Back Pain


Endocrine:  Yes (OBESITY)


Hypothyroidsim, Diabetes, Non-Insulin dep


HEENT:  No


Loss of Vision:  Denies


Hearing Impairment:  Denies


Cancer:  No


Psychosocial:  Yes


Depression


Integumentary:  No


Blood Disorders:  No


Adverse Reaction/Blood Tranf:  No





Family Medical History


Reviewed Nursing Family Hx





Not obtainable due to adoption


Other Conditions/Hx





Physical Exam


Vital Signs





Vital Signs - First Documented








 21





 14:38


 


Temp 35.8


 


Pulse 75


 


Resp 20


 


B/P (MAP) 155/94 (114)


 


Pulse Ox 99





Capillary Refill :


Height, Weight, BMI


Height: 5'9.00"


Weight: 300lbs. 0.0oz. [id number]; 52.4 BMI


Method:Estimated


General Appearance:  No Apparent Distress, WD/WN, Obese


HEENT:  PERRL/EOMI, Pharynx Normal


Neck:  Non Tender, Supple


Respiratory:  Lungs Clear, Normal Breath Sounds


Cardiovascular:  Regular Rate, Rhythm, No Murmur


Gastrointestinal:  Non Tender, Soft


Back:  Normal Inspection, No CVA Tenderness, No Vertebral Tenderness


Neurologic/Psychiatric:  Alert, Oriented x3


Skin:  Normal Color, Warm/Dry





Progress/Results/Core Measures


Suspected Sepsis


SIRS


Temperature: 


Pulse:  


Respiratory Rate: 


 


Laboratory Tests


21 14:41: White Blood Count 9.1


Blood Pressure  / 


Mean: 


 





Laboratory Tests


21 14:41: 


Creatinine 0.74, Platelet Count 214, Total Bilirubin 0.2








Results/Orders


Lab Results





Laboratory Tests








Test


 21


13:35 21


14:41 21


15:07 Range/Units


 


 


Influenza Type A (RT-PCR) Not Detected    Not Detecte  


 


Influenza Type B (RT-PCR) Not Detected    Not Detecte  


 


SARS-CoV-2 RNA (RT-PCR) Not Detected    Not Detecte  


 


White Blood Count


 


 9.1 


 


 4.3-11.0


10^3/uL


 


Red Blood Count


 


 4.19 


 


 3.80-5.11


10^6/uL


 


Hemoglobin  13.0   11.5-16.0  g/dL


 


Hematocrit  40   35-52  %


 


Mean Corpuscular Volume  96   80-99  fL


 


Mean Corpuscular Hemoglobin  31   25-34  pg


 


Mean Corpuscular Hemoglobin


Concent 


 33 


 


 32-36  g/dL





 


Red Cell Distribution Width  12.5   10.0-14.5  %


 


Platelet Count


 


 214 


 


 130-400


10^3/uL


 


Mean Platelet Volume  9.9   9.0-12.2  fL


 


Immature Granulocyte % (Auto)  0    %


 


Neutrophils (%) (Auto)  51   42-75  %


 


Lymphocytes (%) (Auto)  39   12-44  %


 


Monocytes (%) (Auto)  8   0-12  %


 


Eosinophils (%) (Auto)  2   0-10  %


 


Basophils (%) (Auto)  0   0-10  %


 


Neutrophils # (Auto)


 


 4.6 


 


 1.8-7.8


10^3/uL


 


Lymphocytes # (Auto)


 


 3.6 


 


 1.0-4.0


10^3/uL


 


Monocytes # (Auto)


 


 0.7 


 


 0.0-1.0


10^3/uL


 


Eosinophils # (Auto)


 


 0.2 


 


 0.0-0.3


10^3/uL


 


Basophils # (Auto)


 


 0.0 


 


 0.0-0.1


10^3/uL


 


Immature Granulocyte # (Auto)


 


 0.0 


 


 0.0-0.1


10^3/uL


 


D-Dimer


 


 0.27 


 


 0.00-0.49


UG/ML


 


Sodium Level  140   135-145  MMOL/L


 


Potassium Level  4.0   3.6-5.0  MMOL/L


 


Chloride Level  108 H    MMOL/L


 


Carbon Dioxide Level  23   21-32  MMOL/L


 


Anion Gap  9   5-14  MMOL/L


 


Blood Urea Nitrogen  14   7-18  MG/DL


 


Creatinine


 


 0.74 


 


 0.60-1.30


MG/DL


 


Estimat Glomerular Filtration


Rate 


 > 60 


 


  





 


BUN/Creatinine Ratio  19    


 


Glucose Level  100     MG/DL


 


Calcium Level  8.9   8.5-10.1  MG/DL


 


Corrected Calcium  9.0   8.5-10.1  MG/DL


 


Total Bilirubin  0.2   0.1-1.0  MG/DL


 


Aspartate Amino Transf


(AST/SGOT) 


 15 


 


 5-34  U/L





 


Alanine Aminotransferase


(ALT/SGPT) 


 13 


 


 0-55  U/L





 


Alkaline Phosphatase  63     U/L


 


C-Reactive Protein High


Sensitivity 


 0.08 


 


 0.00-0.50


MG/DL


 


B-Type Natriuretic Peptide  81.4   <100.0  PG/ML


 


Total Protein  7.2   6.4-8.2  GM/DL


 


Albumin  3.9   3.2-4.5  GM/DL


 


Procalcitonin  0.01   <0.10  NG/ML


 


TSH Cascade Testing


 


 0.65 


 


 0.35-4.94


UIU/ML


 


Urine Color   YELLOW   


 


Urine Clarity   CLEAR   


 


Urine pH   6.0  5-9  


 


Urine Specific Gravity   1.025 H 1.016-1.022  


 


Urine Protein   NEGATIVE  NEGATIVE  


 


Urine Glucose (UA)   NEGATIVE  NEGATIVE  


 


Urine Ketones   NEGATIVE  NEGATIVE  


 


Urine Nitrite   NEGATIVE  NEGATIVE  


 


Urine Bilirubin   NEGATIVE  NEGATIVE  


 


Urine Urobilinogen   1.0  < = 1.0  MG/DL


 


Urine Leukocyte Esterase   NEGATIVE  NEGATIVE  


 


Urine RBC (Auto)   TRACE-I  NEGATIVE  


 


Urine RBC   RARE   /HPF


 


Urine WBC   0-2   /HPF


 


Urine Squamous Epithelial


Cells 


 


 0-2 


  /HPF





 


Urine Crystals   NONE   /LPF


 


Urine Bacteria   NEGATIVE   /HPF


 


Urine Casts   NONE   /LPF


 


Urine Mucus   NEGATIVE   /LPF


 


Urine Culture Indicated   NO   








My Orders





Orders - AMRYCARMEN HEARD MD


Covid 19 Inhouse Test (21 13:29)


Influenza A And B By Pcr (21 13:29)


Ua Culture If Indicated (21 14:41)


Lactated Ringers (Lr 1000 Ml Iv Solution (21 14:41)


Lactated Ringers (Lr 1000 Ml Iv Solution (21 14:42)


Thyroid Analyzer (21 16:22)





Vital Signs/I&O











 21





 14:38


 


Temp 35.8


 


Pulse 75


 


Resp 20


 


B/P (MAP) 155/94 (114)


 


Pulse Ox 99





Capillary Refill :


Progress Note :  


Progress Note


Seen and evaluated.  IV, labs, UA, chest x-ray, COVID-19 testing and influenza 

testing ordered.  LR 1 L bolus due to history of hypokalemia.  Monitor patient. 

1625: No acute findings on any of the testing.  In further discussion we will go

ahead and add thyroid studies now.  She is eating and drinking well in the room.

 We will see with the results of that are.  Monitor patient.  1719: Thyroid 

studies are normal.  Overall she is doing a little better with fluids.  This may

be nonspecific viral syndrome.  No indication for admission.  I will send a copy

of the chart to Dr. Basilio.  She will follow up with her later this week.  

Discharged home with return precautions.  Patient verbalized understanding of 

instructions and agreement with plan.





Diagnostic Imaging





   Diagonstic Imaging:  Xray


   Plain Films/CT/US/NM/MRI:  chest


Comments


NAME:   STEPHY REDD


MED REC#:   [id number]


ACCOUNT#:   [id number]


PT STATUS:   REG ER


:   1970


PHYSICIAN:   BREANNA SOMERS


ADMIT DATE:   21/ER


                                   ***Draft***


Date of Exam:21





CHEST 1 VIEW, AP/PA ONLY








INDICATION:  cough.  





TECHNIQUE:  Single view chest 2:47 PM.





CORRELATION STUDY:  2018





FINDINGS: 


The heart size, mediastinal configuration and pulmonary


vascularity are within normal limits.  


The lungs are clear with no consolidating infiltrate. There is no


significant effusion or pneumothorax. 





IMPRESSION: 


1. Negative portable chest.





  Dictated on workstation # [id number]








Dict:   21 1445


Trans:   21 1445


DO [id number]





Interpreted by:     MANUEL RODRIGUEZ DO


Electronically signed by:





Departure


Impression





   Primary Impression:  


   Fatigue


   Qualified Codes:  R53.83 - Other fatigue


Disposition:  01 HOME, SELF-CARE


Condition:  Stable





Departure-Patient Inst.


Decision time for Depature:  17:19


Referrals:  


ECHO BASILIO DO (PCP/Family)


Primary Care Physician


Patient Instructions:  Fatigue (DC)





Add. Discharge Instructions:  








All discharge instructions reviewed with patient and/or family. Voiced unde

rstanding.





Continue home medications as directed.  Get plenty of rest.  Drink plenty of 

fluids and eat a normal diet.  Follow-up with your doctor later this week for 

recheck and further evaluation.  Return for worsening symptoms, fever, vomiting,

weakness, breathing problems, loss of taste or smell or other concerns as 

needed.


Work/School Note:  Work Release Form   Date Seen in the Emergency Department:  

2021


   Return to Work:  2021


   Restrictions:  No Restrictions





Copy


Copies To 1:   CEHO BASILIO TIMOTHY D MD          2021 14:47

## 2021-12-13 ENCOUNTER — HOSPITAL ENCOUNTER (OUTPATIENT)
Dept: HOSPITAL 75 - RAD | Age: 51
End: 2021-12-13
Attending: FAMILY MEDICINE
Payer: COMMERCIAL

## 2021-12-13 DIAGNOSIS — Z20.822: ICD-10-CM

## 2021-12-13 DIAGNOSIS — R06.00: Primary | ICD-10-CM

## 2021-12-13 PROCEDURE — 71046 X-RAY EXAM CHEST 2 VIEWS: CPT

## 2021-12-13 NOTE — DIAGNOSTIC IMAGING REPORT
INDICATION: 

Covid neg, dyspnea.



COMPARISON: 

07/11/2021.



FINDINGS: 

Frontal and lateral views of the chest demonstrate normal heart

size and pulmonary vascularity. The lungs are clear. There are no

signs of infiltrate, pleural effusions or pneumothoraces. The

visualized osseous structures show no acute abnormalities.



IMPRESSION: 

No acute process. No signs of infiltrates, effusions, or

pneumothoraces.



Dictated by: 



  Dictated on workstation # [id number]

## 2021-12-19 ENCOUNTER — HOSPITAL ENCOUNTER (EMERGENCY)
Dept: HOSPITAL 75 - ER | Age: 51
Discharge: HOME | End: 2021-12-19
Payer: COMMERCIAL

## 2021-12-19 VITALS — BODY MASS INDEX: 36.9 KG/M2 | HEIGHT: 68.9 IN | WEIGHT: 249.12 LBS

## 2021-12-19 VITALS — SYSTOLIC BLOOD PRESSURE: 131 MMHG | DIASTOLIC BLOOD PRESSURE: 81 MMHG

## 2021-12-19 DIAGNOSIS — E11.9: ICD-10-CM

## 2021-12-19 DIAGNOSIS — J06.9: Primary | ICD-10-CM

## 2021-12-19 DIAGNOSIS — E66.9: ICD-10-CM

## 2021-12-19 DIAGNOSIS — R00.2: ICD-10-CM

## 2021-12-19 DIAGNOSIS — Z20.822: ICD-10-CM

## 2021-12-19 DIAGNOSIS — I10: ICD-10-CM

## 2021-12-19 LAB
ALBUMIN SERPL-MCNC: 4.1 GM/DL (ref 3.2–4.5)
ALP SERPL-CCNC: 70 U/L (ref 40–136)
ALT SERPL-CCNC: 14 U/L (ref 0–55)
APTT BLD: 24 SEC (ref 24–35)
APTT PPP: YELLOW S
BACTERIA #/AREA URNS HPF: NEGATIVE /HPF
BASOPHILS # BLD AUTO: 0.1 10^3/UL (ref 0–0.1)
BASOPHILS NFR BLD AUTO: 1 % (ref 0–10)
BILIRUB SERPL-MCNC: 0.3 MG/DL (ref 0.1–1)
BILIRUB UR QL STRIP: NEGATIVE
BUN/CREAT SERPL: 16
CALCIUM SERPL-MCNC: 9.1 MG/DL (ref 8.5–10.1)
CHLORIDE SERPL-SCNC: 103 MMOL/L (ref 98–107)
CO2 SERPL-SCNC: 22 MMOL/L (ref 21–32)
CREAT SERPL-MCNC: 0.77 MG/DL (ref 0.6–1.3)
EOSINOPHIL # BLD AUTO: 0.3 10^3/UL (ref 0–0.3)
EOSINOPHIL NFR BLD AUTO: 3 % (ref 0–10)
FIBRINOGEN PPP-MCNC: CLEAR MG/DL
GFR SERPLBLD BASED ON 1.73 SQ M-ARVRAT: 79 ML/MIN
GLUCOSE SERPL-MCNC: 74 MG/DL (ref 70–105)
GLUCOSE UR STRIP-MCNC: NEGATIVE MG/DL
HCT VFR BLD CALC: 41 % (ref 35–52)
HGB BLD-MCNC: 13.3 G/DL (ref 11.5–16)
INR PPP: 0.9 (ref 0.8–1.4)
KETONES UR QL STRIP: NEGATIVE
LEUKOCYTE ESTERASE UR QL STRIP: NEGATIVE
LYMPHOCYTES # BLD AUTO: 4 10^3/UL (ref 1–4)
LYMPHOCYTES NFR BLD AUTO: 40 % (ref 12–44)
MAGNESIUM SERPL-MCNC: 2.1 MG/DL (ref 1.6–2.4)
MANUAL DIFFERENTIAL PERFORMED BLD QL: NO
MCH RBC QN AUTO: 30 PG (ref 25–34)
MCHC RBC AUTO-ENTMCNC: 33 G/DL (ref 32–36)
MCV RBC AUTO: 92 FL (ref 80–99)
MONOCYTES # BLD AUTO: 0.9 10^3/UL (ref 0–1)
MONOCYTES NFR BLD AUTO: 9 % (ref 0–12)
NEUTROPHILS # BLD AUTO: 4.7 10^3/UL (ref 1.8–7.8)
NEUTROPHILS NFR BLD AUTO: 47 % (ref 42–75)
NITRITE UR QL STRIP: NEGATIVE
PH UR STRIP: 6.5 [PH] (ref 5–9)
PLATELET # BLD: 251 10^3/UL (ref 130–400)
PMV BLD AUTO: 10.6 FL (ref 9–12.2)
POTASSIUM SERPL-SCNC: 4.2 MMOL/L (ref 3.6–5)
PROT SERPL-MCNC: 8.1 GM/DL (ref 6.4–8.2)
PROT UR QL STRIP: NEGATIVE
PROTHROMBIN TIME: 12.8 SEC (ref 12.2–14.7)
RBC #/AREA URNS HPF: (no result) /HPF
RENAL EPI CELLS #/AREA URNS HPF: (no result) /HPF
SODIUM SERPL-SCNC: 139 MMOL/L (ref 135–145)
SP GR UR STRIP: <=1.005 (ref 1.02–1.02)
SQUAMOUS #/AREA URNS HPF: (no result) /HPF
TSH SERPL DL<=0.05 MIU/L-ACNC: 1.52 UIU/ML (ref 0.35–4.94)
WBC # BLD AUTO: 9.9 10^3/UL (ref 4.3–11)
WBC #/AREA URNS HPF: (no result) /HPF

## 2021-12-19 PROCEDURE — 84145 PROCALCITONIN (PCT): CPT

## 2021-12-19 PROCEDURE — 71045 X-RAY EXAM CHEST 1 VIEW: CPT

## 2021-12-19 PROCEDURE — 83735 ASSAY OF MAGNESIUM: CPT

## 2021-12-19 PROCEDURE — 86141 C-REACTIVE PROTEIN HS: CPT

## 2021-12-19 PROCEDURE — 85610 PROTHROMBIN TIME: CPT

## 2021-12-19 PROCEDURE — 93041 RHYTHM ECG TRACING: CPT

## 2021-12-19 PROCEDURE — 85025 COMPLETE CBC W/AUTO DIFF WBC: CPT

## 2021-12-19 PROCEDURE — 36415 COLL VENOUS BLD VENIPUNCTURE: CPT

## 2021-12-19 PROCEDURE — 81000 URINALYSIS NONAUTO W/SCOPE: CPT

## 2021-12-19 PROCEDURE — 93005 ELECTROCARDIOGRAM TRACING: CPT

## 2021-12-19 PROCEDURE — 80053 COMPREHEN METABOLIC PANEL: CPT

## 2021-12-19 PROCEDURE — 85730 THROMBOPLASTIN TIME PARTIAL: CPT

## 2021-12-19 PROCEDURE — 83615 LACTATE (LD) (LDH) ENZYME: CPT

## 2021-12-19 PROCEDURE — 84443 ASSAY THYROID STIM HORMONE: CPT

## 2021-12-19 PROCEDURE — 85379 FIBRIN DEGRADATION QUANT: CPT

## 2021-12-19 PROCEDURE — 84484 ASSAY OF TROPONIN QUANT: CPT

## 2021-12-19 PROCEDURE — 87636 SARSCOV2 & INF A&B AMP PRB: CPT

## 2021-12-19 PROCEDURE — 83874 ASSAY OF MYOGLOBIN: CPT

## 2021-12-19 NOTE — ED CARDIAC GENERAL
History of Present Illness


General


Chief Complaint:  Chest Pain


Stated Complaint:  SOB/HEART FLUTTERING/CHEST PRESSURE





History of Present Illness


Date Seen by Provider:  Dec 19, 2021


Time Seen by Provider:  17:10


Initial Comments


51-year-old  female reports having symptoms of shortness of air, 

weakness, malaise and occasional cough since 2021.  She received her COVID

vaccines in 2021.  She was tested for Covid and strep on 2021 and 

was found to be negative.  She was started on an albuterol inhaler, her last use

was yesterday.  She wears CPAP and has been unable to because of shortness of 

air at night.  She has no history of DVTs or PEs.  She is not on anticoagulants 

or aspirin.  She had a stress test in  prior to bariatric surgery which was 

negative.  She does not see cardiology. Reports intermittent palpitations and 

flutter in chest. She denies chest pain. She monitors her SaO2 regularly and it 

si %, her pulse has been . No family members have similar symptoms. 


Patient anxious about symptoms, enquiring about having CT of heart and stress 

test in ED. She has an elderly friend staying with her, who is a retired nurse 

providing her possibilities of her symptoms and she has been looking her 

symptoms up on [employer].


Timing/Duration:  6-7 days


Severity:  mild


Prior CP/Workup:  no prior chest pain


NTG SL PTA:  No


ASA po PTA:  No


Associated Systoms:  No Chest Pain, No Cough, No Diaphoresis, No Fever/Chills, 

No Headaches, No Loss of Appetite; Malaise; No Nausea/Vomiting, No Rash, No 

Seizure; Shortness of Air; No Syncope; Weakness





Allergies and Home Medications


Allergies


Coded Allergies:  


     sulfamethoxazole (Verified  Adverse Reaction, Intermediate, RASH/ITCHING, 

17)


     trimethoprim (Verified  Adverse Reaction, Intermediate, RASH/ITCHING, 

17)





Patient Home Medication List


Home Medication List Reviewed:  Yes


Estradiol (Estradiol Tablet) 1 Mg Tablet, 1 MG PO DAILY, (Reported)


   Entered as Reported by: CHARLOTTE CROWDER on 17


Losartan Potassium (Losartan Potassium) 100 Mg Tablet, 100 MG PO DAILY, 

(Reported)


   Entered as Reported by: FLAQUITO MCDANIEL on 21 1059


Spironolactone (Spironolactone) 100 Mg Tablet, 100 MG PO DAILY, (Reported)


   Entered as Reported by: FLAQUITO MCDANIEL on 17 1055





Review of Systems


Review of Systems


Constitutional:  see HPI; No dizziness, No fever; malaise, weakness


EENTM:  No Symptoms Reported, See HPI


Respiratory:  See HPI, Cough, Shortness of Air, SOA With Exertion; Denies 

Wheezing


Cardiovascular:  See HPI; Denies Chest Pain; Palpitations


Gastrointestinal:  No Symptoms Reported, See HPI


Genitourinary:  No Symptoms Reported, See HPI


Musculoskeletal:  no symptoms reported, see HPI


Skin:  no symptoms reported, see HPI


Psychiatric/Neurological:  No Symptoms Reported, See HPI





All Other Systems Reviewed


Negative Unless Noted:  Yes





Past Medical-Social-Family Hx


Immunizations Up To Date


Tetanus Booster (TDap):  Less than 5yrs


PED Vaccines UTD:  Yes





Seasonal Allergies


Seasonal Allergies:  Yes





Past Medical History


Surgeries:  Yes (BACK SURG, GASTRIC SLEEVE)


Bladder Surgery, Hysterectomy, Oophorectomy, Orthopedic, Tonsillectomy


Respiratory:  No (recent sleep study done-needs a cpap machine-waiting)


Cardiac:  Yes


Hypertension


Neurological:  No


Reproductive Disorders:  No


Female Reproductive Disorders:  Endometriosis, Polycystic Ovarian Dis


GYN History:  Hysterectomy


Genitourinary:  Yes


Bladder Infection, Neurogenic Bladder


Gastrointestinal:  Yes (dyspagea)


Gastroesophageal Reflux, Diverticulosis, Ulcer


Musculoskeletal:  Yes (BACK SX FOR HERNIATED DISC)


Chronic Back Pain


Endocrine:  Yes (OBESITY)


Hypothyroidsim, Diabetes, Non-Insulin dep


HEENT:  No


Loss of Vision:  Denies


Hearing Impairment:  Denies


Cancer:  No


Psychosocial:  Yes


Depression


Integumentary:  No


Blood Disorders:  No


Adverse Reaction/Blood Tranf:  No





Family Medical History


Reviewed Nursing Family Hx





Not obtainable due to adoption


Other Conditions/Hx





Physical Exam


Vital Signs





Vital Signs - First Documented








 21





 17:20 19:00


 


Temp 36.8 


 


Pulse 82 


 


Resp 20 


 


B/P (MAP) 157/94 (115) 


 


Pulse Ox 100 


 


O2 Delivery  Room Air





Capillary Refill :


Height, Weight, BMI


Height: 5'9.00"


Weight: 300lbs. 0.0oz. [id number]; 38.00 BMI


Method:Estimated


General Appearance:  WD/WN, Anxious


HEENT:  PERRL/EOMI, TMs Normal, Normal ENT Inspection, Pharynx Normal


Neck:  Full Range of Motion, Normal Inspection, Non Tender, Supple


Respiratory:  Chest Non Tender, Lungs Clear, Normal Breath Sounds, No Accessory 

Muscle Use, No Respiratory Distress


Cardiovascular:  Regular Rate, Rhythm, No Edema, No Murmur, Normal Peripheral 

Pulses


Gastrointestinal:  Normal Bowel Sounds, Non Tender, Soft


Extremity:  Normal Capillary Refill, Normal Inspection, Normal Range of Motion, 

Non Tender, No Calf Tenderness, No Pedal Edema; No Calf Tenderness


Neurologic/Psychiatric:  Alert, Oriented x3, No Motor/Sensory Deficits, Normal 

Mood/Affect


Skin:  Normal Color, Warm/Dry





Progress/Results/Core Measures


Results/Orders


Lab Results





Laboratory Tests








Test


 21


17:22 21


17:37 21


18:02 Range/Units


 


 


Urine Color YELLOW     


 


Urine Clarity CLEAR     


 


Urine pH 6.5    5-9  


 


Urine Specific Gravity <=1.005    1.016-1.022  


 


Urine Protein NEGATIVE    NEGATIVE  


 


Urine Glucose (UA) NEGATIVE    NEGATIVE  


 


Urine Ketones NEGATIVE    NEGATIVE  


 


Urine Nitrite NEGATIVE    NEGATIVE  


 


Urine Bilirubin NEGATIVE    NEGATIVE  


 


Urine Urobilinogen 0.2    < = 1.0  MG/DL


 


Urine Leukocyte Esterase NEGATIVE    NEGATIVE  


 


Urine RBC (Auto) NEGATIVE    NEGATIVE  


 


Urine RBC NONE     /HPF


 


Urine WBC NONE     /HPF


 


Urine Squamous Epithelial


Cells 0-2 


 


 


  /HPF





 


Urine Renal Epithelial Cells NONE     /HPF


 


Urine Crystals NONE     /LPF


 


Urine Bacteria NEGATIVE     /HPF


 


Urine Casts NONE     /LPF


 


Urine Mucus NEGATIVE     /LPF


 


Urine Culture Indicated NO     


 


White Blood Count


 


 9.9 


 


 4.3-11.0


10^3/uL


 


Red Blood Count


 


 4.45 


 


 3.80-5.11


10^6/uL


 


Hemoglobin  13.3   11.5-16.0  g/dL


 


Hematocrit  41   35-52  %


 


Mean Corpuscular Volume  92   80-99  fL


 


Mean Corpuscular Hemoglobin  30   25-34  pg


 


Mean Corpuscular Hemoglobin


Concent 


 33 


 


 32-36  g/dL





 


Red Cell Distribution Width  13.7   10.0-14.5  %


 


Platelet Count


 


 251 


 


 130-400


10^3/uL


 


Mean Platelet Volume  10.6   9.0-12.2  fL


 


Immature Granulocyte % (Auto)  0    %


 


Neutrophils (%) (Auto)  47   42-75  %


 


Lymphocytes (%) (Auto)  40   12-44  %


 


Monocytes (%) (Auto)  9   0-12  %


 


Eosinophils (%) (Auto)  3   0-10  %


 


Basophils (%) (Auto)  1   0-10  %


 


Neutrophils # (Auto)


 


 4.7 


 


 1.8-7.8


10^3/uL


 


Lymphocytes # (Auto)


 


 4.0 


 


 1.0-4.0


10^3/uL


 


Monocytes # (Auto)


 


 0.9 


 


 0.0-1.0


10^3/uL


 


Eosinophils # (Auto)


 


 0.3 


 


 0.0-0.3


10^3/uL


 


Basophils # (Auto)


 


 0.1 


 


 0.0-0.1


10^3/uL


 


Immature Granulocyte # (Auto)


 


 0.0 


 


 0.0-0.1


10^3/uL


 


Prothrombin Time  12.8   12.2-14.7  SEC


 


INR Comment  0.9   0.8-1.4  


 


Activated Partial


Thromboplast Time 


 24 


 


 24-35  SEC





 


D-Dimer


 


 0.19 


 


 0.00-0.49


UG/ML


 


Sodium Level  139   135-145  MMOL/L


 


Potassium Level  4.2   3.6-5.0  MMOL/L


 


Chloride Level  103     MMOL/L


 


Carbon Dioxide Level  22   21-32  MMOL/L


 


Anion Gap  14   5-14  MMOL/L


 


Blood Urea Nitrogen  12   7-18  MG/DL


 


Creatinine


 


 0.77 


 


 0.60-1.30


MG/DL


 


Estimat Glomerular Filtration


Rate 


 79 


 


  





 


BUN/Creatinine Ratio  16    


 


Glucose Level  74     MG/DL


 


Calcium Level  9.1   8.5-10.1  MG/DL


 


Corrected Calcium  9.0   8.5-10.1  MG/DL


 


Magnesium Level  2.1   1.6-2.4  MG/DL


 


Total Bilirubin  0.3   0.1-1.0  MG/DL


 


Aspartate Amino Transf


(AST/SGOT) 


 31 


 


 5-34  U/L





 


Alanine Aminotransferase


(ALT/SGPT) 


 14 


 


 0-55  U/L





 


Alkaline Phosphatase  70     U/L


 


Lactate Dehydrogenase  266 H  125-220  U/L


 


Myoglobin


 


 39.9 


 


 10.0-92.0


NG/ML


 


Troponin I  < 0.028   <0.028  NG/ML


 


C-Reactive Protein High


Sensitivity 


 0.05 


 


 0.00-0.50


MG/DL


 


Total Protein  8.1   6.4-8.2  GM/DL


 


Albumin  4.1   3.2-4.5  GM/DL


 


Procalcitonin  0.00   <0.10  NG/ML


 


TSH Cascade Testing


 


 1.52 


 


 0.35-4.94


UIU/ML


 


Influenza Type A (RT-PCR)   Not Detected  Not Detecte  


 


Influenza Type B (RT-PCR)   Not Detected  Not Detecte  


 


SARS-CoV-2 RNA (RT-PCR)   Not Detected  Not Detecte  








My Orders





Orders - MARGO PHILLIPS


Cbc With Automated Diff (21 17:19)


Magnesium (21 17:19)


Chest 1 View, Ap/Pa Only (21 17:19)


Ekg Tracing (21 17:19)


Comprehensive Metabolic Panel (21 17:19)


Myoglobin Serum (21 17:19)


Protime With Inr (21 17:19)


Partial Thromboplastin Time (21 17:19)


O2 (21 17:19)


Monitor-Rhythm Ecg Trace Only (21 17:19)


Ed Iv/Invasive Line Start (21 17:19)


Fibrin Degradation Products (21 17:19)


Troponin I Rachael (21 17:19)


Aspirin Chewable Tablet (Baby Aspirin Ch (21 17:30)


Ua Culture If Indicated (21 17:20)


Hs C Reactive Protein (21 17:20)


Procalcitonin (Pct) (21 17:20)


LDH (21 17:20)


Influenza A And B By Pcr (21 17:38)


Covid 19 Inhouse Test (21 17:38)


Thyroid Analyzer (21 18:02)


Lidocaine 2% Viscous 15 Ml (Xylocaine Vi (21 19:00)


Antacid  Suspension (Mylanta  Suspension (21 19:00)





Medications Given in ED





Current Medications








 Medications  Dose


 Ordered  Sig/Juan


 Route  Start Time


 Stop Time Status Last Admin


Dose Admin


 


 Al Hydrox/Mg


 Hydrox/Simethicone  30 ml  ONCE  ONCE


 PO  21 19:00


 21 19:01 DC 21 18:54


30 ML


 


 Aspirin  324 mg  ONCE  ONCE


 PO  21 17:30


 21 17:31 DC 21 18:01


324 MG


 


 Lidocaine HCl  15 ml  ONCE  ONCE


 PO  21 19:00


 21 19:01 DC 21 18:54


15 ML








Vital Signs/I&O











 21





 17:20 19:00


 


Temp 36.8 36.8


 


Pulse 82 66


 


Resp 20 20


 


B/P (MAP) 157/94 (115) 131/81


 


Pulse Ox 100 100


 


O2 Delivery  Room Air











Progress


Progress Note :  


   Time:  17:10


Progress Note


Patient seen and evaluated, will obtain labs, Covid and influenza testing, EKG 

and chest x-ray.  Aspirin 324 mg orally.


1800 patient has continued in sinus rhythm 70s to 80s, she denies any 

palpitations.


 Covid and influenza negative.  All lab results negative, D-dimer normal so 

no indication for CT angio.  Patient reports a history of GERD in the past that 

has improved with a GI cocktail, will try this.


 patient reports improvement in symptoms after GI cocktail.  Discharge 

instructions and return precautions reviewed with the patient.  Stressed the 

importance of following up with primary care.


Initial ECG Impression Date:  Dec 19, 2021


Initial ECG Impression Time:  17:19


Initial ECG Rate:  84


Initial ECG Rhythm:  Normal Sinus


Initial ECG Intervals:  Normal


Initial ECG Intervals


; QRSD 94; ; QTc 473. 


Axis P 41; QRS 12; T 8


Initial ECG Impression:  Normal


Initial ECG Comparisson:  Unchanged





Diagnostic Imaging





   Diagonstic Imaging:  Xray


   Plain Films/CT/US/NM/MRI:  chest


Comments


NAME:   STEPHY REDD


MED REC#:   [id number]


ACCOUNT#:   [id number]


PT STATUS:   REG ER


:   1970


PHYSICIAN:   MARGO PHILLIPS


ADMIT DATE:   21/ER


                                   ***Draft***


Date of Exam:21





CHEST 1 VIEW, AP/PA ONLY








INDICATION: Chest pain.





Comparison made with prior examination of 2021.





FINDINGS: The heart size, mediastinal configuration, and


pulmonary vascularity are within normal limits. There is no


pleural effusion, pneumothorax, or pneumonia. The osseous


structures are unremarkable. 





IMPRESSION: No acute cardiopulmonary abnormality.








  Dictated on workstation # [id number]








Dict:   21 1759


Trans:   21 [id number]


CVB [id number]





Interpreted by:     DANA ARMENDARIZ


   Reviewed:  Reviewed by Me





Departure


Impression





   Primary Impression:  


   URI (upper respiratory infection)


   Qualified Codes:  J06.9 - Acute upper respiratory infection, unspecified


   Additional Impression:  


   Palpitations


Disposition:  01 HOME, SELF-CARE


Condition:  Improved





Departure-Patient Inst.


Decision time for Depature:  18:45


Referrals:  


ECHO DUARTE DO (PCP/Family)


Primary Care Physician


Patient Instructions:  Viral Upper Respiratory Infection, Adult (DC), 

Palpitations (DC)





Add. Discharge Instructions:  


Follow-up with your primary care provider if symptoms are not improving or 

worsen. Consider referral to cardiology for palpitations.


Continue your home medications as prescribed.


[place]inn diet, avoid fried, greasy, or spicy foods.


Increase water intake, 16 oz every 2 hours, while awake. 


Take aspirin 81 mg daily. 


Use your inhaler, every 4-6 hours for shortness of air. 


Return to Emergency Dept for new, urgent healthcare needs. 








All discharge instructions reviewed with patient and/or family. Voiced 

understanding.





Copy


Copies To 1:   ECHO DUARTEEMARGO                  Dec 19, 2021 17:53

## 2021-12-19 NOTE — DIAGNOSTIC IMAGING REPORT
INDICATION: Chest pain.



Comparison made with prior examination of 07/11/2021.



FINDINGS: The heart size, mediastinal configuration, and

pulmonary vascularity are within normal limits. There is no

pleural effusion, pneumothorax, or pneumonia. The osseous

structures are unremarkable. 



IMPRESSION: No acute cardiopulmonary abnormality.



Dictated by: 



  Dictated on workstation # [id number]

## 2022-01-28 ENCOUNTER — HOSPITAL ENCOUNTER (OUTPATIENT)
Dept: HOSPITAL 75 - CARD | Age: 52
End: 2022-01-28
Attending: INTERNAL MEDICINE
Payer: COMMERCIAL

## 2022-01-28 DIAGNOSIS — I49.9: Primary | ICD-10-CM

## 2022-01-28 DIAGNOSIS — I11.9: ICD-10-CM

## 2022-01-28 PROCEDURE — 93306 TTE W/DOPPLER COMPLETE: CPT

## 2022-01-28 PROCEDURE — 93226 XTRNL ECG REC<48 HR SCAN A/R: CPT

## 2022-01-28 PROCEDURE — 93225 XTRNL ECG REC<48 HRS REC: CPT

## 2022-02-25 ENCOUNTER — HOSPITAL ENCOUNTER (OUTPATIENT)
Dept: HOSPITAL 75 - RAD | Age: 52
End: 2022-02-25
Attending: INTERNAL MEDICINE
Payer: COMMERCIAL

## 2022-02-25 DIAGNOSIS — I71.2: Primary | ICD-10-CM

## 2022-02-25 LAB
BUN/CREAT SERPL: 30
CREAT SERPL-MCNC: 0.74 MG/DL (ref 0.6–1.3)
GFR SERPLBLD BASED ON 1.73 SQ M-ARVRAT: 97 ML/MIN

## 2022-02-25 PROCEDURE — 82565 ASSAY OF CREATININE: CPT

## 2022-02-25 PROCEDURE — 71275 CT ANGIOGRAPHY CHEST: CPT

## 2022-02-25 PROCEDURE — 84520 ASSAY OF UREA NITROGEN: CPT

## 2022-02-25 PROCEDURE — 36415 COLL VENOUS BLD VENIPUNCTURE: CPT

## 2022-02-25 NOTE — DIAGNOSTIC IMAGING REPORT
PROCEDURE: CT angiography of the chest with contrast.



TECHNIQUE: Multiple contiguous axial images were obtained through

the chest after uneventful bolus administration of intravenous

contrast. 3D reconstructed CTA MIP acquisitions were also

performed.

Auto Exposure Controls were utilized during the CT exam to meet

ALARA standards for radiation dose reduction. 

 

INDICATION:  Unruptured thoracic aortic aneurysm for follow-up.



COMPARISON with chest CT date 06/02/2017. 



The aortic root at the level of the sinus of Valsalva 3.4 cm

within normal limits. Root at the sinotubular junction is 3.3 cm

within normal limits. The ascending aorta ectatic at 3.9 cm. No

mural hemorrhage, dissection or rupture of the arch and great

vessels unremarkable, the descending thoracic aorta normal in

caliber, the vessel showing normal distal tapering. There is no

pulmonary arterial embolus or filling defect. The lungs are

clear. No failure, effusion or pneumothorax. No acute soft tissue

or osseous chest wall pathology. The visualized upper abdomen

shows normal caliber patent upper abdominal aorta and the takeoff

of the celiac and superior mesenteric arteries normal. There are

postoperative changes to the stomach. The adrenals are negative.



IMPRESSION:



Ascending aortic ectasia. Root calibers above. No mural

hemorrhage, dissection, rupture or focal aneurysmal dilatation.

No acute appearing aortic pathology.



Postoperative stomach. The CT anterior chest otherwise normal.



Dictated by: 



  Dictated on workstation # [id number]

## 2022-02-28 ENCOUNTER — HOSPITAL ENCOUNTER (OUTPATIENT)
Dept: HOSPITAL 75 - CARD | Age: 52
End: 2022-02-28
Attending: INTERNAL MEDICINE
Payer: COMMERCIAL

## 2022-02-28 VITALS — WEIGHT: 249.12 LBS | BODY MASS INDEX: 36.9 KG/M2 | HEIGHT: 68.9 IN

## 2022-02-28 VITALS — SYSTOLIC BLOOD PRESSURE: 132 MMHG | DIASTOLIC BLOOD PRESSURE: 100 MMHG

## 2022-02-28 DIAGNOSIS — I10: ICD-10-CM

## 2022-02-28 DIAGNOSIS — I49.9: Primary | ICD-10-CM

## 2022-02-28 PROCEDURE — 93017 CV STRESS TEST TRACING ONLY: CPT

## 2022-02-28 PROCEDURE — 78452 HT MUSCLE IMAGE SPECT MULT: CPT

## 2022-02-28 NOTE — CARDIOLOGY STRESS TEST REPORT
Stress Test Report


Date of Procedure/Referring:


Date of Procedure:  Feb 28, 2022


PCP


Sandy Ball MD


Admitting Physician


Jocelyn Basilio DO





Indications:


palpitation





Baseline Heart Rate:


57





Baseline Blood Pressure:


Blood Pressure Systolic:  132


Blood Pressure Diastolic:  100


Baseline Vitals





Vital Signs








  Date Time  Temp Pulse Resp B/P (MAP) Pulse Ox O2 Delivery O2 Flow Rate FiO2


 


2/28/22 09:16  60  132/100 (111)    











Baseline EKG:


Baseline EKG:  NSR





Summary


After explaining the procedure to the patient, she  signed a consent and then 

brought to the stress nuclear laboratory.


Patient received 0.4 mg Lexiscan for stress test, ECG, heart rate and blood 

pressure were monitored continuously.  Resting and stress dose of radio tracer 

were injected, imaging was acquired and reviewed in short axis, horizontal long 

axis and vertical long axis views.


TID:  1.03


SSS:  5


SDS:  5


EF:  68


1.  Patient was unable to exercise beyond 2 minutes and 26 seconds on Tate 

protocol, test was converted to Lexiscan Myoview stress test.


2.  Patient tolerated Lexiscan well


3.  Reversible ischemia involving the mid to apical anterior wall


4.  Normal left ventricular size, ejection fraction 68%











SANDY BALL MD              Feb 28, 2022 11:29

## 2022-03-11 ENCOUNTER — HOSPITAL ENCOUNTER (OUTPATIENT)
Dept: HOSPITAL 75 - CATH | Age: 52
Discharge: HOME | End: 2022-03-11
Attending: INTERNAL MEDICINE
Payer: COMMERCIAL

## 2022-03-11 VITALS — DIASTOLIC BLOOD PRESSURE: 77 MMHG | SYSTOLIC BLOOD PRESSURE: 125 MMHG

## 2022-03-11 VITALS — SYSTOLIC BLOOD PRESSURE: 118 MMHG | DIASTOLIC BLOOD PRESSURE: 70 MMHG

## 2022-03-11 VITALS — SYSTOLIC BLOOD PRESSURE: 110 MMHG | DIASTOLIC BLOOD PRESSURE: 73 MMHG

## 2022-03-11 VITALS — BODY MASS INDEX: 40.35 KG/M2 | WEIGHT: 278.66 LBS | HEIGHT: 69.49 IN

## 2022-03-11 VITALS — DIASTOLIC BLOOD PRESSURE: 71 MMHG | SYSTOLIC BLOOD PRESSURE: 137 MMHG

## 2022-03-11 VITALS — SYSTOLIC BLOOD PRESSURE: 119 MMHG | DIASTOLIC BLOOD PRESSURE: 74 MMHG

## 2022-03-11 VITALS — SYSTOLIC BLOOD PRESSURE: 110 MMHG | DIASTOLIC BLOOD PRESSURE: 84 MMHG

## 2022-03-11 VITALS — DIASTOLIC BLOOD PRESSURE: 67 MMHG | SYSTOLIC BLOOD PRESSURE: 112 MMHG

## 2022-03-11 VITALS — SYSTOLIC BLOOD PRESSURE: 129 MMHG | DIASTOLIC BLOOD PRESSURE: 85 MMHG

## 2022-03-11 DIAGNOSIS — Z99.89: ICD-10-CM

## 2022-03-11 DIAGNOSIS — I65.23: ICD-10-CM

## 2022-03-11 DIAGNOSIS — Z79.890: ICD-10-CM

## 2022-03-11 DIAGNOSIS — I25.10: Primary | ICD-10-CM

## 2022-03-11 DIAGNOSIS — I10: ICD-10-CM

## 2022-03-11 DIAGNOSIS — R00.2: ICD-10-CM

## 2022-03-11 DIAGNOSIS — I49.9: ICD-10-CM

## 2022-03-11 DIAGNOSIS — E78.2: ICD-10-CM

## 2022-03-11 DIAGNOSIS — G47.33: ICD-10-CM

## 2022-03-11 DIAGNOSIS — Z79.899: ICD-10-CM

## 2022-03-11 DIAGNOSIS — E03.9: ICD-10-CM

## 2022-03-11 LAB
ALBUMIN SERPL-MCNC: 3.9 GM/DL (ref 3.2–4.5)
ALP SERPL-CCNC: 59 U/L (ref 40–136)
ALT SERPL-CCNC: 14 U/L (ref 0–55)
APTT BLD: 25 SEC (ref 24–35)
APTT PPP: YELLOW S
BACTERIA #/AREA URNS HPF: NEGATIVE /HPF
BILIRUB SERPL-MCNC: 1 MG/DL (ref 0.1–1)
BILIRUB UR QL STRIP: NEGATIVE
BUN/CREAT SERPL: 23
CALCIUM SERPL-MCNC: 9.1 MG/DL (ref 8.5–10.1)
CHLORIDE SERPL-SCNC: 107 MMOL/L (ref 98–107)
CHOLEST SERPL-MCNC: 218 MG/DL (ref ?–200)
CO2 SERPL-SCNC: 20 MMOL/L (ref 21–32)
CREAT SERPL-MCNC: 0.71 MG/DL (ref 0.6–1.3)
FIBRINOGEN PPP-MCNC: CLEAR MG/DL
GFR SERPLBLD BASED ON 1.73 SQ M-ARVRAT: 102 ML/MIN
GLUCOSE SERPL-MCNC: 92 MG/DL (ref 70–105)
GLUCOSE UR STRIP-MCNC: NEGATIVE MG/DL
HCT VFR BLD CALC: 39 % (ref 35–52)
HDLC SERPL-MCNC: 62 MG/DL (ref 40–60)
HGB BLD-MCNC: 12.7 G/DL (ref 11.5–16)
INR PPP: 1 (ref 0.8–1.4)
KETONES UR QL STRIP: NEGATIVE
LEUKOCYTE ESTERASE UR QL STRIP: NEGATIVE
MCH RBC QN AUTO: 30 PG (ref 25–34)
MCHC RBC AUTO-ENTMCNC: 32 G/DL (ref 32–36)
MCV RBC AUTO: 92 FL (ref 80–99)
NITRITE UR QL STRIP: NEGATIVE
PH UR STRIP: 6 [PH] (ref 5–9)
PLATELET # BLD: 249 10^3/UL (ref 130–400)
PMV BLD AUTO: 10 FL (ref 9–12.2)
POTASSIUM SERPL-SCNC: 3.9 MMOL/L (ref 3.6–5)
PROT SERPL-MCNC: 6.9 GM/DL (ref 6.4–8.2)
PROT UR QL STRIP: NEGATIVE
PROTHROMBIN TIME: 13.5 SEC (ref 12.2–14.7)
RBC #/AREA URNS HPF: (no result) /HPF
SODIUM SERPL-SCNC: 139 MMOL/L (ref 135–145)
SP GR UR STRIP: 1.02 (ref 1.02–1.02)
SQUAMOUS #/AREA URNS HPF: (no result) /HPF
TRIGL SERPL-MCNC: 81 MG/DL (ref ?–150)
VLDLC SERPL CALC-MCNC: 16 MG/DL (ref 5–40)
WBC # BLD AUTO: 6.7 10^3/UL (ref 4.3–11)
WBC #/AREA URNS HPF: (no result) /HPF

## 2022-03-11 PROCEDURE — 80061 LIPID PANEL: CPT

## 2022-03-11 PROCEDURE — 87081 CULTURE SCREEN ONLY: CPT

## 2022-03-11 PROCEDURE — 80053 COMPREHEN METABOLIC PANEL: CPT

## 2022-03-11 PROCEDURE — 93458 L HRT ARTERY/VENTRICLE ANGIO: CPT

## 2022-03-11 PROCEDURE — 85610 PROTHROMBIN TIME: CPT

## 2022-03-11 PROCEDURE — 93005 ELECTROCARDIOGRAM TRACING: CPT

## 2022-03-11 PROCEDURE — 85730 THROMBOPLASTIN TIME PARTIAL: CPT

## 2022-03-11 PROCEDURE — 71045 X-RAY EXAM CHEST 1 VIEW: CPT

## 2022-03-11 PROCEDURE — 36415 COLL VENOUS BLD VENIPUNCTURE: CPT

## 2022-03-11 PROCEDURE — 85027 COMPLETE CBC AUTOMATED: CPT

## 2022-03-11 PROCEDURE — 81000 URINALYSIS NONAUTO W/SCOPE: CPT

## 2022-03-11 NOTE — CARDIAC CATH REPORT
Cardiac Cath Report


Physician (s)/Assistant (s)


Physician


SANDY JACKSON MD





Pre-Procedure Diagnosis


Pre-Procedure Diagnosis:  coronary artery disease, abnormal stress





Post-Procedure Note


Procedure Start Date:  Mar 11, 2022


Procedure Start Time:  11:50


Name of Procedure:  


Left heart catheterization


Findings/Procedure Note


PROCEDURE NOTE:


52-year-old lady with history of hypertension, hyperlipidemia, had an abnormal 

stress test with anterior wall ischemia, scheduled for cardiac catheterization 

possible PTCA.


After explaining the procedure to the patient, all pros and cons were explained,

all questions were answered.  The patient signed the consent and then she  was 

placed on the cardiac catheterization laboratory. Groin was prepped SL fashion 

local anesthesia was used.  Attempt to get an access in the right radial artery 

failed.  Sheath placed in the right femoral artery. Dontrell right and left 

catheter were used to access the coronary system. Pigtail was used to access the

left ventricular cavity. 


Left ventriculogram was not done, pressure was measured





At the end of the procedure the sheath was removed. Closure device was deployed





FINDINGS:





Hemodynamics 


/13, end-diastolic pressure of 13


Aorta 142/79, mean of 106





ANATOMY:


Left Main is free of obstructive disease


Left Anterior Descending has mild disease nonobstructive disease


Left Circumflex has mild disease nonobstructive disease


Right Coronary Artery has mild disease nonobstructive disease


LV Gram was not done, pressure was measured





CONCLUSION:


1.  Mild coronary artery disease nonobstructive disease


2.  Normal left ventricular end-diastolic pressure





DISCUSSION AND RECOMMENDATION:


Medical therapy is recommended no intervention is warranted


Anesthesia Type:  Conscious Sedation


Estimated blood loss (mL):  20 ml


Contrast Amount:  45 ml


Total Radiation Dose:  463 mGy





Post-Procedure Diagnosis


Post-operative diagnosis:  


Chest pain


Coronary artery disease


Hypertension


Hyperlipidemia











SANDY JACKSON MD              Mar 11, 2022 11:52

## 2022-03-11 NOTE — DISCHARGE INST-POST CATH
Discharge Inst-CATH/EP


Problems Reviewed?:  Yes


Post Cardiac Cath/EP D/C Inst


Follow Up/Plan


Appointment with Dr. Ball's office in 2 to 4 weeks


<b>CARDIAC CATH/EP PROCEDURE DISCHARGE INSTRUCTIONS</b>





ACTIVITY





* Go Home directly and rest.


* Limit activity of the leg (or wrist if it was used) for 7 days including aer

obics, swimming,


   jogging, bicycling, etc.


* Restrict stair-climbing for 7 days if possible, if not, climb up with your 

non-cath leg, then


   bring together on the same step.


* Avoid lifting, pushing, pulling or excessive movement of the affected extremi

ty for 7 days.


* Customary sexual activity may be resumed after 2 days-use caution not to use a

position  


   that strains or causes pain to the affected extremity.


* No driving for 24 hours.


* NO SMOKING. 


* Avoid straining for bowel movements for 7 days.


* Gentle walking on level ground is allowed.


* Returning to work will depend on the type of procedure and the results. Your 

doctor will discuss


   this with you.





CALL YOUR DOCTOR FOR ANY OF THE FOLLOWING:





*If bleeding from the puncture site occurs- Apply gentle pressure to site with 

clean cloth and call


   your doctor or EMS.


* If a knot or lump forms under the skin, increases in size, or causes pain.


* If bruising appears to be worsening or moving further down your leg instead of

disappearing.


* Temperature above 101 F.





CARE OF YOUR GROIN INCISION;





* Bruising or purple discoloration of the skin near the puncture site is common.


* You may shower only, no bathtub bathing for 5 days.  Be careful to avoid 

slipping as your


   leg may feel stiff.


* If a closure device was used on your femoral artery, please see the attached 

guide regarding


   care of the device and your leg.


* Leave dressing on FOR 24 hours.





CARE OF YOUR WRIST INCISION;





* Bruising or purple discoloration of the skin near the puncture site is common.


* You may shower.


* DO NOT submerge wrist.


* Leave dressing on FOR 24 hours.











SANDY BALL MD              Mar 11, 2022 11:49

## 2022-04-04 ENCOUNTER — HOSPITAL ENCOUNTER (OUTPATIENT)
Dept: HOSPITAL 75 - RAD | Age: 52
End: 2022-04-04
Attending: FAMILY MEDICINE
Payer: COMMERCIAL

## 2022-04-04 DIAGNOSIS — G62.9: Primary | ICD-10-CM

## 2022-04-04 PROCEDURE — 73610 X-RAY EXAM OF ANKLE: CPT

## 2022-04-04 PROCEDURE — 73630 X-RAY EXAM OF FOOT: CPT

## 2022-04-04 NOTE — DIAGNOSTIC IMAGING REPORT
HISTORY: Left ankle pain



TECHNIQUE: 3 views of the left ankle



COMPARISON: None



FINDINGS:



No acute fracture or dislocation is seen in the left ankle.

Alignment appears normal. The ankle mortise appears symmetric.

There is mild soft tissue swelling about the left ankle. No

significant joint effusion is seen. There is an os trigonum.

There is calcaneal enthesopathy. A bone island is seen in the

anterior calcaneus.



IMPRESSION:

1. No acute osseous abnormality is seen in the left ankle.

2. Mild soft tissue swelling about the left ankle.



Dictated by: 



  Dictated on workstation # [id number]

## 2022-04-04 NOTE — DIAGNOSTIC IMAGING REPORT
HISTORY: Left foot and ankle pain.



TECHNIQUE: 3 views of the left foot



COMPARISON: None



FINDINGS:



No acute fracture or dislocation is seen in the left foot.

Alignment appears normal. Joint spaces are preserved. No cortical

erosions are seen. There are mild degenerative changes in the

midfoot. There is a bone island at the anterior calcaneus. There

are small calcaneal enthesophytes. There appears to be mild pes

planus.



IMPRESSION:

1. Chronic findings in the left foot with no acute osseous

abnormality seen.



Dictated by: 



  Dictated on workstation # [id number]

## 2022-04-07 ENCOUNTER — HOSPITAL ENCOUNTER (OUTPATIENT)
Dept: HOSPITAL 75 - RAD | Age: 52
End: 2022-04-07
Attending: FAMILY MEDICINE
Payer: COMMERCIAL

## 2022-04-07 DIAGNOSIS — G62.9: Primary | ICD-10-CM

## 2022-04-07 PROCEDURE — 93922 UPR/L XTREMITY ART 2 LEVELS: CPT

## 2022-04-07 NOTE — DIAGNOSTIC IMAGING REPORT
INDICATION: 52-year-old female lower extremity ankle and foot

pain.



TECHNIQUE: Segmental pulse pressures were performed of the upper

and lower extremities.



FINDINGS:



Resting Doppler Blood Pressures

RIGHT

  Brachial:  101 mmHg

  Ankle (Posterior Tibial):  127 mm Hg, index 1.25

  Ankle (Dorsalis Pedis):  109 mm Hg, index 1.07



LEFT

  Brachial:  102 mmHg

  Ankle (Posterior Tibial):  112 mm Hg, index 1.10

  Ankle (Dorsalis Pedis):  122 mm Hg, index 1.20



IMPRESSION: 

Ankle-brachial indices as above.







Ankle-Brachial Index      Diagnosis/Interpretation

<=0.90                                Peripheral Arterial Disease

0.91-0.99                            Borderline

1.00-1.40                            Normal

>1.40                                  Concern for

noncompressible arteries,

                                             (assoc with Diabetes

Mellitus)



Dictated by: 



  Dictated on workstation # [id number]

## 2022-10-21 ENCOUNTER — HOSPITAL ENCOUNTER (OUTPATIENT)
Dept: HOSPITAL 75 - RAD | Age: 52
End: 2022-10-21
Attending: NURSE PRACTITIONER
Payer: COMMERCIAL

## 2022-10-21 DIAGNOSIS — Z12.31: Primary | ICD-10-CM

## 2022-10-21 PROCEDURE — 77067 SCR MAMMO BI INCL CAD: CPT

## 2022-10-21 PROCEDURE — 77063 BREAST TOMOSYNTHESIS BI: CPT

## 2022-10-21 NOTE — DIAGNOSTIC IMAGING REPORT
INDICATION: Routine screening.



COMPARISON: Prior mammograms of 8/10/2020 and 7/31/2019.



EXAMINATION: 2D and 3D bilateral screening mammography was

performed with CAD. 



The current study was also evaluated with a Computer Aided

Detection (CAD) system.



FINDINGS: Scattered fibroglandular densities are identified,

bilaterally. The parenchymal pattern is stable. No mass or

malignant-appearing microcalcifications are seen. Axillae are

unremarkable.



IMPRESSION: No mammographic features suspicious for malignancy

are identified. 



ACR BI-RADS Category 1: Negative.

Result letter will be mailed to the patient.

Note:  At least 10% of breast cancer is not imaged by

mammography.



Dictated by: 



  Dictated on workstation # [id number]

## 2022-12-09 ENCOUNTER — HOSPITAL ENCOUNTER (OUTPATIENT)
Dept: HOSPITAL 75 - RAD | Age: 52
End: 2022-12-09
Attending: REGISTERED NURSE
Payer: COMMERCIAL

## 2022-12-09 DIAGNOSIS — M25.561: ICD-10-CM

## 2022-12-09 DIAGNOSIS — M17.12: Primary | ICD-10-CM

## 2022-12-09 PROCEDURE — 73562 X-RAY EXAM OF KNEE 3: CPT

## 2022-12-09 NOTE — DIAGNOSTIC IMAGING REPORT
INDICATION: Knee pain. 



EXAMINATION: Left knee 12/09/2022. 



COMPARISON: 02/08/2016.



FINDINGS: 3 views of the knee.



Mild patellofemoral narrowing and spurring is noted. Minimal

narrowing noted in the medial compartment with the lateral joint

space preserved. No acute fracture or dislocation. There is a

moderate joint effusion.



IMPRESSION: Joint effusion with degenerative findings noted. No

acute osseous abnormality. 



Dictated by: 



  Dictated on workstation # [id number]

## 2022-12-30 ENCOUNTER — HOSPITAL ENCOUNTER (OUTPATIENT)
Dept: HOSPITAL 75 - RAD | Age: 52
End: 2022-12-30
Attending: NURSE PRACTITIONER
Payer: COMMERCIAL

## 2022-12-30 DIAGNOSIS — S83.232A: Primary | ICD-10-CM

## 2022-12-30 DIAGNOSIS — M71.22: ICD-10-CM
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
## 2022-12-30 DIAGNOSIS — M22.42: ICD-10-CM

## 2022-12-30 PROCEDURE — 73721 MRI JNT OF LWR EXTRE W/O DYE: CPT

## 2022-12-30 NOTE — DIAGNOSTIC IMAGING REPORT
EXAMINATION: Magnetic resonance imaging of the left knee without

intravenous contrast



DATE: December 30, 2022.



COMPARISON: Left knee radiographs December 9, 2022.



INDICATION: 52-year-old female, left knee pain.



TECHNIQUE: Multiplanar, multisequence non contrast enhanced MR

imaging was accomplished.



FINDINGS:



MENISCI:

There is an oblique tear involving the body and posterior horn of

the medial meniscus. There is additional radial tearing of the

posterior root attachment of the medial meniscus. There is

currently 2 to 3 mm medial meniscal extrusion.



The lateral meniscus is intact.



LIGAMENTS AND TENDONS:

The anterior and posterior cruciate ligaments are intact.



The medial collateral ligament is intact.



The iliotibial band, mid third lateral capsular ligament, fibular

collateral ligament, biceps femoris tendon and conjoined tendon

are intact.



The quadriceps tendon and patella ligament are intact.



JOINT:

There are areas of full-thickness cartilage loss involving the

inferior half of the cartilage of the lateral patellar facet.

There is no additional identified cartilage defect. There is no

knee joint effusion, prominent synovitis, or intra-articular

body.



BONE:

There is unremarkable bone marrow signal. Specifically, negative

for fracture, osteomyelitis, osteonecrosis, or marrow replacing

process.



BURSAE AND SOFT TISSUES:

There is a Baker's cyst. There is nonspecific prepatellar

subcutaneous edema.



IMPRESSION: 

1. Oblique tear involving the body and posterior horn of the

medial meniscus with additional radial oriented tear of the

posterior root attachment of the medial meniscus. There is

currently 2 to 3 mm medial meniscal extrusion.

2. Grossly intact lateral meniscus.

3. Intact anterior and posterior cruciate ligaments. Additional

ligaments and tendons are intact.

4. Prominent cartilage loss of the inferior aspect of the lateral

patellar facet with otherwise intact articular cartilage. No

sizable knee joint effusion.

5. No acute fracture, bone contusion, stress reaction, or

evidence of osteonecrosis.

6. Baker's cyst.



 



Dictated by: 



  Dictated on workstation # [id number]

## 2023-02-02 ENCOUNTER — HOSPITAL ENCOUNTER (OUTPATIENT)
Dept: HOSPITAL 75 - PREOP | Age: 53
LOS: 1 days | Discharge: HOME | End: 2023-02-03
Attending: ORTHOPAEDIC SURGERY
Payer: COMMERCIAL

## 2023-02-02 VITALS — HEIGHT: 69.02 IN | WEIGHT: 250.45 LBS | BODY MASS INDEX: 37.09 KG/M2

## 2023-02-02 DIAGNOSIS — Z01.818: Primary | ICD-10-CM

## 2023-02-08 ENCOUNTER — HOSPITAL ENCOUNTER (OUTPATIENT)
Dept: HOSPITAL 75 - SDC | Age: 53
Discharge: HOME | End: 2023-02-08
Attending: ORTHOPAEDIC SURGERY
Payer: COMMERCIAL

## 2023-02-08 VITALS — DIASTOLIC BLOOD PRESSURE: 79 MMHG | SYSTOLIC BLOOD PRESSURE: 143 MMHG

## 2023-02-08 VITALS — SYSTOLIC BLOOD PRESSURE: 136 MMHG | DIASTOLIC BLOOD PRESSURE: 79 MMHG

## 2023-02-08 VITALS — DIASTOLIC BLOOD PRESSURE: 79 MMHG | SYSTOLIC BLOOD PRESSURE: 136 MMHG

## 2023-02-08 VITALS — SYSTOLIC BLOOD PRESSURE: 139 MMHG | DIASTOLIC BLOOD PRESSURE: 74 MMHG

## 2023-02-08 VITALS — SYSTOLIC BLOOD PRESSURE: 130 MMHG | DIASTOLIC BLOOD PRESSURE: 82 MMHG

## 2023-02-08 VITALS — DIASTOLIC BLOOD PRESSURE: 70 MMHG | SYSTOLIC BLOOD PRESSURE: 119 MMHG

## 2023-02-08 VITALS — WEIGHT: 250.45 LBS | HEIGHT: 69.02 IN | BODY MASS INDEX: 37.09 KG/M2

## 2023-02-08 VITALS — DIASTOLIC BLOOD PRESSURE: 80 MMHG | SYSTOLIC BLOOD PRESSURE: 106 MMHG

## 2023-02-08 VITALS — SYSTOLIC BLOOD PRESSURE: 155 MMHG | DIASTOLIC BLOOD PRESSURE: 93 MMHG

## 2023-02-08 DIAGNOSIS — M22.42: ICD-10-CM
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
## 2023-02-08 DIAGNOSIS — E66.9: ICD-10-CM

## 2023-02-08 DIAGNOSIS — S83.232A: Primary | ICD-10-CM

## 2023-02-08 PROCEDURE — 87081 CULTURE SCREEN ONLY: CPT

## 2023-02-08 NOTE — ANESTHESIA-GENERAL POST-OP
General


Patient Condition


Mental Status/LOC:  Same as Preop


Cardiovascular:  Satisfactory


Nausea/Vomiting:  Absent


Respiratory:  Satisfactory


Pain:  Controlled


Complications:  Absent





Post Op Complications


Complications


None





Follow Up Care/Instructions


Patient Instructions


None needed.





Anesthesia/Patient Condition


Patient Condition


Patient is doing well, no complaints, stable vital signs, no apparent adverse 

anesthesia problems.   


No complications reported per nursing.











MAYNOR FLORES CRNA               Feb 8, 2023 10:33

## 2023-02-08 NOTE — PHYSICAL THERAPY ORTHO EVAL
PT Orthopedic Evaluation


Type of Surgery


Knee Scope





Prior Level of Function


Current Living Status:  Significant Other


Locomotion     (Upon Admit):  Independent


Established Durable Medical Eq:  None


Patient was educated on the improved ease on stairs with crutches, but she ins

isted on using a FWW.  Patient was educated on use of FWW and she reports "we 

will stop somewhere on the way home and get one."





Subjective


Subjective


Patient lying on left side in bed upon PT arrival, agreeable to treatment.  

Patient rates pain at 7/10 in the left knee currently.


Entry Into Home:  Stairs With Railing


Steps Into Home:  4


Steps Inside Home:  0


Steps Accessories:  Railing Present





Motor Control


Motor Control:  Motor Control WNL





ROM


ROM:  WFL, except focal deficit





Strength


Strength:  Gen Weak,No Focal Deficit





Transfer


SCALE: Activities may be completed with or without assistive devices.





6-Indepedent-patient completes the activity by him/herself with no assistance 

from a helper.


5-Set-up or Clean-up Assistance-helper sets up or cleans up; patient completes 

activity. [place] assists only prior to or  


    following the activity.


4-Supervision or Touching Assistance-helper provides verbal cues and/or 

touching/steadying and/or contact guard assistance as patient completes 

activity. Assistance may be provided   


    throughout the activity or intermittently.


3-Partial/Moderate Assistance-helper does LESS THAN HALF the effort. [place] l

ifts, holds or supports trunk or limbs, but provides less than half the effort.


2-Substantial/Maximal Assistance-helper does MORE THAN HALF the effort. [place] 

lifts or holds trunk or limbs and provides more than half the effort.


2-Gdkaheoog-uxgsyt does ALL the effort. Patient does none of the effort to 

complete the activity. Or, the assistance of 2 or more helpers is required for t

he patient to complete the  


    activity.


If activity was not attempted, code reason:


7-Patient Refused.


9-Not Applicable-not attempted and the patient did not perform the activity 

before the current illness, exacerbation or injury.


10-Not Attempted due to Environmental Limitations-(lack of equipment, weather 

restraints, etc.).


88-Not Attempted due to Medical Conditions or Safety Concerns.


Transfers (B, C, W/C) (QC):  4





Gait


Gait Assistive Device:  FWW


Right Lower Extremity:  Right


Weight Bearing Status RLE:  Full Weight Bearing


Left Lower Extremity:  Left


Weight Bearing Status LLE:  Weight Bearing/Tolerated


Gait (QC):  4


Distance:  30 feet





Stairs


#of Steps:  4


Walking Assistive Device:  Walker





Assessment/Goals


Goal Time Frame:  1 Visit


Safe Ambulation:  Yes





Plan


Treatment Plan:  Discharge


PT/Family Agrees to Plan:  Yes





Time


Time In:  1210


Time Out:  1227


Total Billed Treatment Time:  17


Billed Treatment Time


Visit, МАРИЯ Heaton PT                 Feb 8, 2023 14:55

## 2023-02-08 NOTE — PROGRESS NOTE-POST OPERATIVE
Post-Operative Progess Note


Surgeon (s)/Assistant (s)


Surgeon


DON STORM MD


Assistant:  Jacob Gay





Pre-Operative Diagnosis


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





Post-Operative Diagnosis





left knee medial meniscus tear and chondromalacia of the medial femoral


condyle, lateral tibial plateau and trochle





Procedure & Operative Findings


Date of Procedure


2/8/23


Procedure Performed/Findings


left knee arthroscopic partial medial meniscectomy  and chondroplassty of the 

medial femoral condyle, lateral tibial plateau and trochlea


Anesthesia Type


GETA





Estimated Blood Loss


Estimated blood loss (mL):  miinimal





Specimens/Packing


Specimens Removed


none


Packing:  


none











DON STORM MD             Feb 8, 2023 09:21

## 2023-02-08 NOTE — PROGRESS NOTE-PRE OPERATIVE
Pre-Operative Progress Note


Date of Available H&P:  Jan 23, 2023


Date H&P Reviewed:  Feb 8, 2023


Time H&P Reviewed:  09:20


Changes from last HP


none


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











DON STORM MD             Feb 8, 2023 09:20

## 2023-02-08 NOTE — OPERATIVE REPORT
DATE OF SERVICE: 02/08/2023



PREOPERATIVE DIAGNOSES:

1.  Left knee medial meniscus tear.

2.  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

3.  Left knee chondromalacia of the patella.



POSTOPERATIVE DIAGNOSES:

1.  Left knee medial meniscus tear.

2.  Left knee chondromalacia of medial femoral condyle.

3.  Left knee chondromalacia of lateral tibial plateau.

4.  Left knee chondromalacia of the trochlea.



PROCEDURES:

1.  Left knee arthroscopic partial medial meniscectomy.

2.  Left knee arthroscopic chondroplasty of medial femoral condyle.

3.  Left knee arthroscopic chondroplasty of the lateral tibial plateau.

4.  Left knee arthroscopic chondroplasty of the trochlea.



SURGEON:

Yohannes Storm MD



ASSISTANT:

Jacob Gay, who assisted throughout the procedure and closed the incisions.



ANESTHESIA:

General endotracheal by _____ CRNA.



TOURNIQUET TIME:

Not applicable.



ESTIMATED BLOOD LOSS:

Minimal.



DRAINS:

None.



COMPLICATIONS:

None.



POSTOPERATIVE PLAN:

Routine arthroscopy protocol.  The patient was transported to the recovery room 

awake and in stable condition.



STATEMENT OF MEDICAL NECESSITY:

The patient is a 53-year-old female with complaints of left medial knee pain, 

catching, locking and swelling.  She had patellofemoral crepitus and pain with 

patellar loading.  She also had some mild medial and patellofemoral joint space 

narrowing.  She tried rest, activity modifications and anti-inflammatories 

without relief.  Due to functional impairment and failure to improve with 

conservative measures, the patient elected to proceed with surgical 

intervention.  Examination under anesthesia revealed range of motion of 0/0/125 

with negative Lachman, negative anterior and posterior drawer.  No varus or 

valgus laxity, negative pivot shift.  Arthroscopic findings of the patella 

demonstrated grade IV chondral loss inferiorly in a 10 x 10 area.  The trochlea 

demonstrated grade 3 chondral flap superiorly in a 10 x 10 area.  The medial and

lateral gutters were clear.  The ACL and PCL were intact.  The lateral 

compartment demonstrated grade 2 chondral flaps over the central portion of the 

tibial plateau in a 10 x 10 area.  No meniscal pathology was noted laterally.  

The medial compartment demonstrated grade 2 chondral flaps of the central 

portion of the femoral condyle in a 10 x 10 area and a complex tear of the 

posterior horn of the medial meniscus involving approximately one-half of the 

posterior horn.



DESCRIPTION OF PROCEDURE:

After risks and benefits of the procedure were discussed and questions were 

answered and informed consent was signed and placed on chart, operative site was

confirmed in the preoperative holding initialed by surgeon.  The patient was 

then transported to the operating room and after adequate levels of general 

endotracheal anesthetic were obtained, timeout was called, confirming the 

operative site.  Examination under anesthesia was performed with the above 

findings noted.  The left lower extremity was prepped and draped in the usual 

sterile fashion.  The knee joint was injected with 60 mL of fluid and a standard

inferolateral portal was _____ arthroscope under direct visualization, inferior 

medial portal was created.  The menisci cruciates carefully probed with the 

above findings noted.  The unstable chondral flaps on the trochlea were debrided

with a shaver back to a stable edge.  Scope was then redirected into the lateral

compartment and unstable chondral flaps in the lateral tibial plateau were 

debrided with a shaver back to a stable edge.  Scope was then redirected into 

the medial compartment and unstable chondral flaps on the medial femoral condyle

were debrided with a shaver back to a stable edge and the posterior horn of the 

medial meniscus was debrided with a biter and a shaver back to a stable edge.  

This was carefully probed with no further tearing or instability noted.  The 

knee was copiously irrigated.  The portal sites were closed with 4-0 nylon in 

simple interrupted fashion.  Knee was injected with Duramorph.  The portal sites

were infiltrated with plain Marcaine.  A soft dressing was applied and the 

patient was transported to the recovery room awake and in stable condition.





Job ID: [id number]

DocumentID: [id number]

Dictated Date: 02/08/2023 10:38:55

Transcription Date: 02/08/2023 15:11:00

Dictated By: YOHANNES STORM MD

## 2023-05-15 ENCOUNTER — HOSPITAL ENCOUNTER (OUTPATIENT)
Dept: HOSPITAL 75 - RAD | Age: 53
End: 2023-05-15
Attending: ORTHOPAEDIC SURGERY
Payer: COMMERCIAL

## 2023-05-15 DIAGNOSIS — M51.16: ICD-10-CM

## 2023-05-15 DIAGNOSIS — M47.26: Primary | ICD-10-CM

## 2023-05-15 DIAGNOSIS — M24.28: ICD-10-CM

## 2023-05-15 DIAGNOSIS — M47.27: ICD-10-CM

## 2023-05-15 DIAGNOSIS — M48.061: ICD-10-CM

## 2023-05-15 PROCEDURE — 72148 MRI LUMBAR SPINE W/O DYE: CPT

## 2023-05-15 NOTE — DIAGNOSTIC IMAGING REPORT
PROCEDURE: MRI lumbar spine.



TECHNIQUE: Multiplanar, multisequence MRI of the lumbar spine was

performed without contrast.



INDICATION:  Right lower extremity radiculopathy.



COMPARISON: None



FINDINGS: For the purposes of this exam, last well-formed disc

space is noted the L5-S1 level. Evaluation static alignment shows

mild levoscoliotic deformity. There is no significant

anterolisthesis or retrolisthesis. There is no evidence of jumped

facets.



Vertebral body heights are maintained. There is no acute

fracture. Evaluation marrow signal demonstrates multilevel Modic

type endplate changes.



There is mild multilevel intervertebral disc height loss as well

as multilevel anterior and posterior disc bulging. Visualized

portions of distal cord are unremarkable. Conus terminates

approximately the L1-L2 level. No abnormal intrathecal filling

defect is seen. Pre and paravertebral soft tissue structures are

unremarkable.



Axial images demonstrate the following: T12-L1: There is no large

disc bulge or focal protrusion. There is no significant spinal

canal or neural foraminal stenosis.



L1-L2: There is small right paracentral posterior disc

protrusion, but no significant spinal canal or neural foraminal

stenosis.



L2-L3: There is broad-based posterior disc bulge and bilateral

ligamentum flavum laxity and facet arthropathy. As a result,

there is mild narrowing of spinal canal and bilateral neural

foramen.



L3-L4: There is broad-based posterior disc bulge and bilateral

ligamentum flavum laxity and facet arthropathy. As a result,

there is mild narrowing of spinal canal and left neural foramen.

There is also moderate stenosis on the right.



L4-L5: There is asymmetric left posterior lateral endplate

osteophyte formation. There is no large disc bulge. There is also

bilateral facet arthropathy. As a result, there is asymmetric

moderate stenosis of the left neural foramen. There is also mild

narrowing on the right. Spinal canal is minimally narrowed.



L5-S1: There is no large disc bulge or focal protrusion. There is

bilateral facet arthropathy, but no significant spinal canal or

neural foraminal stenosis.



IMPRESSION:

1. Multilevel degenerative changes of the lumbar spine as

described above.

2. No acute fracture or dislocation.



Dictated by: 



  Dictated on workstation # [id number]

## 2023-10-09 ENCOUNTER — HOSPITAL ENCOUNTER (OUTPATIENT)
Dept: HOSPITAL 75 - RAD | Age: 53
End: 2023-10-09
Attending: NURSE PRACTITIONER
Payer: COMMERCIAL

## 2023-10-09 DIAGNOSIS — Z12.31: Primary | ICD-10-CM

## 2023-10-09 PROCEDURE — 77063 BREAST TOMOSYNTHESIS BI: CPT

## 2023-10-09 PROCEDURE — 77067 SCR MAMMO BI INCL CAD: CPT

## 2023-10-10 NOTE — DIAGNOSTIC IMAGING REPORT
INDICATION: Bilateral digital 2-D and 3-D screening with CAD.



COMPARISON:  10/2022, 02/08/2020 and 07/2019. 



FINDINGS:  Density 1.  No breast mass, spiculated lesion,

architectural distortion, suspicious calcifications or changes to

suggest malignancy.



IMPRESSION:



ACR BI-RADS Category 1: Negative.

Result letter will be mailed to the patient.

Note:  At least 10% of breast cancer is not imaged by

mammography.



BI-RADS Category 1



Dictated by: 



  Dictated on workstation # [id number]